# Patient Record
Sex: FEMALE | Race: BLACK OR AFRICAN AMERICAN | Employment: FULL TIME | URBAN - METROPOLITAN AREA
[De-identification: names, ages, dates, MRNs, and addresses within clinical notes are randomized per-mention and may not be internally consistent; named-entity substitution may affect disease eponyms.]

---

## 2017-01-17 ENCOUNTER — OFFICE VISIT (OUTPATIENT)
Dept: INTERNAL MEDICINE CLINIC | Age: 32
End: 2017-01-17

## 2017-01-17 ENCOUNTER — HOSPITAL ENCOUNTER (OUTPATIENT)
Dept: LAB | Age: 32
Discharge: HOME | End: 2017-01-17
Payer: OTHER GOVERNMENT

## 2017-01-17 VITALS
HEIGHT: 66 IN | DIASTOLIC BLOOD PRESSURE: 73 MMHG | RESPIRATION RATE: 16 BRPM | BODY MASS INDEX: 24.68 KG/M2 | SYSTOLIC BLOOD PRESSURE: 114 MMHG | WEIGHT: 153.6 LBS | HEART RATE: 60 BPM | OXYGEN SATURATION: 99 % | TEMPERATURE: 96.9 F

## 2017-01-17 DIAGNOSIS — N89.8 VAGINAL IRRITATION: ICD-10-CM

## 2017-01-17 DIAGNOSIS — Z32.02 PREGNANCY TEST NEGATIVE: ICD-10-CM

## 2017-01-17 DIAGNOSIS — N89.8 VAGINAL IRRITATION: Primary | ICD-10-CM

## 2017-01-17 LAB
BILIRUB UR QL STRIP: NORMAL
GLUCOSE UR-MCNC: NEGATIVE MG/DL
HCG URINE, QL. (POC): NEGATIVE
KETONES P FAST UR STRIP-MCNC: NORMAL MG/DL
PH UR STRIP: 5.5 [PH] (ref 4.6–8)
PROT UR QL STRIP: NEGATIVE MG/DL
SP GR UR STRIP: 1.02 (ref 1–1.03)
UA UROBILINOGEN AMB POC: NORMAL (ref 0.2–1)
URINALYSIS CLARITY POC: CLEAR
URINALYSIS COLOR POC: YELLOW
URINE BLOOD POC: NORMAL
URINE LEUKOCYTES POC: NEGATIVE
URINE NITRITES POC: NEGATIVE
VALID INTERNAL CONTROL?: YES

## 2017-01-17 PROCEDURE — 87481 CANDIDA DNA AMP PROBE: CPT | Performed by: FAMILY MEDICINE

## 2017-01-17 NOTE — PATIENT INSTRUCTIONS

## 2017-01-17 NOTE — PROGRESS NOTES
Pt is here to establish care. Family planning. Vaginal irritation x 1 week. Denies vaginal discharge    Do you have an advance directive no  Request Pt bring a copy of advance directive for scanning. Do you want information on an advance directive no    Pt declines  mychart activation. 1. Have you been to the ER, urgent care clinic since your last visit? Hospitalized since your last visit? No    2. Have you seen or consulted any other health care providers outside of the [address] [place] [address] since your last visit? Include any pap smears or colon screening.  No

## 2017-01-17 NOTE — MR AVS SNAPSHOT
Visit Information Date & Time Provider Department Dept. Phone Encounter #  
 1/17/2017  3:15 PM Janak Caldwell DO [facility] [place] [address] [id number] Follow-up Instructions Return if symptoms worsen or fail to improve. Upcoming Health Maintenance Date Due  
 PAP AKA CERVICAL CYTOLOGY 5/23/2017 DTaP/Tdap/Td series (2 - Td) 10/1/2023 Allergies as of 1/17/2017  Review Complete On: 1/17/2017 By: Emilie Byrd LPN No Known Allergies Current Immunizations  Never Reviewed Name Date Hep B Vaccine 7/14/1997, 5/13/1997 Influenza Vaccine 10/24/2016, 9/23/2015 MMR 4/4/1985 TB Skin Test (PPD) Intradermal 4/29/2016  3:47 PM, 4/22/2016  3:20 AM  
 Tdap 10/1/2013 Varicella Virus Vaccine 9/22/2015, 5/13/1997 Not reviewed this visit You Were Diagnosed With   
  
 Codes Comments Vaginal irritation    -  Primary ICD-10-CM: A18.7 ICD-9-CM: 623.9 Pregnancy test negative     ICD-10-CM: Z32.02 
ICD-9-CM: V72.41 Vitals BP Pulse Temp Resp Height(growth percentile) Weight(growth percentile) 114/73 60 96.9 °F (36.1 °C) (Oral) 16 5' 6\" (1.676 m) 153 lb 9.6 oz (69.7 kg) LMP SpO2 BMI OB Status Smoking Status 12/22/2016 (Exact Date) 99% 24.79 kg/m2 Having regular periods Never Smoker Vitals History BMI and BSA Data Body Mass Index Body Surface Area 24.79 kg/m 2 1.8 m 2 Preferred Pharmacy Pharmacy Name Phone [facility], [address] [place] [address] [phone] Your Updated Medication List  
  
   
This list is accurate as of: 1/17/17  3:58 PM.  Always use your most recent med list.  
  
  
  
  
 dicyclomine 10 mg capsule Commonly known as:  BENTYL Take 1 capsule by mouth three (3) times daily. Take as needed  
  
 fluticasone 50 mcg/actuation nasal spray Commonly known as:  Misha [place] 2 Sprays by Both Nostrils route daily. ketoconazole 2 % shampoo Commonly known as:  NIZORAL Apply to scalp ; lather  And rinse as directed  
  
 naproxen 500 mg tablet Commonly known as:  NAPROSYN Take 1 Tab by mouth two (2) times daily (with meals). Gaviota Fulling TD  
by TransDERmal route. * norelgestromin-ethinyl estradiol 150-35 mcg/24 hr  
Commonly known as:  ORTHO EVRA  
1 Patch by TransDERmal route every Sunday. traMADol-acetaminophen 37.5-325 mg per tablet Commonly known as:  ULTRACET Take 1 Tab by mouth every four (4) hours as needed for Pain. Max Daily Amount: 6 Tabs. * Notice: This list has 2 medication(s) that are the same as other medications prescribed for you. Read the directions carefully, and ask your doctor or other care provider to review them with you. We Performed the Following AMB POC URINALYSIS DIP STICK AUTO W/O MICRO [09820 CPT(R)] AMB POC URINE PREGNANCY TEST, VISUAL COLOR COMPARISON [08475 CPT(R)] Follow-up Instructions Return if symptoms worsen or fail to improve. To-Do List   
 01/17/2017 Lab:  HERPES SIMPLEX VIRUS (HSV) WILLA   
  
 01/24/2017 Lab:  Katarina Dumont Patient Instructions A Healthy Lifestyle: Care Instructions Your Care Instructions A healthy lifestyle can help you feel good, stay at a healthy weight, and have plenty of energy for both work and play. A healthy lifestyle is something you can share with your whole family. A healthy lifestyle also can lower your risk for serious health problems, such as high blood pressure, heart disease, and diabetes. You can follow a few steps listed below to improve your health and the health of your family. Follow-up care is a key part of your treatment and safety. Be sure to make and go to all appointments, and call your doctor if you are having problems. Its also a good idea to know your test results and keep a list of the medicines you take. How can you care for yourself at home? · Do not eat too much sugar, fat, or fast foods. You can still have dessert and treats now and then. The goal is moderation. · Start small to improve your eating habits. Pay attention to portion sizes, drink less juice and soda pop, and eat more fruits and vegetables. ¨ Eat a healthy amount of food. A 3-ounce serving of meat, for example, is about the size of a deck of cards. Fill the rest of your plate with vegetables and whole grains. ¨ Limit the amount of soda and sports drinks you have every day. Drink more water when you are thirsty. ¨ Eat at least 5 servings of fruits and vegetables every day. It may seem like a lot, but it is not hard to reach this goal. A serving or helping is 1 piece of fruit, 1 cup of vegetables, or 2 cups of leafy, raw vegetables. Have an apple or some carrot sticks as an afternoon snack instead of a candy bar. Try to have fruits and/or vegetables at every meal. 
· Make exercise part of your daily routine. You may want to start with simple activities, such as walking, bicycling, or slow swimming. Try to be active 30 to 60 minutes every day. You do not need to do all 30 to 60 minutes all at once. For example, you can exercise 3 times a day for 10 or 20 minutes. Moderate exercise is safe for most people, but it is always a good idea to talk to your doctor before starting an exercise program. 
· Keep moving. Ling Goltz the lawn, work in the garden, or [place]. Take the stairs instead of the elevator at work. · If you smoke, quit. People who smoke have an increased risk for heart attack, stroke, cancer, and other lung illnesses. Quitting is hard, but there are ways to boost your chance of quitting tobacco for good. ¨ Use nicotine gum, patches, or lozenges. ¨ Ask your doctor about stop-smoking programs and medicines. ¨ Keep trying.  
In addition to reducing your risk of diseases in the future, you will notice some benefits soon after you stop using tobacco. If you have shortness of breath or asthma symptoms, they will likely get better within a few weeks after you quit. · Limit how much alcohol you drink. Moderate amounts of alcohol (up to 2 drinks a day for men, 1 drink a day for women) are okay. But drinking too much can lead to liver problems, high blood pressure, and other health problems. Family health If you have a family, there are many things you can do together to improve your health. · Eat meals together as a family as often as possible. · Eat healthy foods. This includes fruits, vegetables, lean meats and dairy, and whole grains. · Include your family in your fitness plan. Most people think of activities such as jogging or tennis as the way to fitness, but there are many ways you and your family can be more active. Anything that makes you breathe hard and gets your heart pumping is exercise. Here are some tips: 
¨ Walk to do errands or to take your child to school or the bus. ¨ Go for a family bike ride after dinner instead of watching TV. Where can you learn more? Go to http://colin-belkis.info/. Enter B731 in the search box to learn more about \"A Healthy Lifestyle: Care Instructions. \" Current as of: July 26, 2016 Content Version: 11.1 © 9369-9765 [employer]. Care instructions adapted under license by [employer] (which disclaims liability or warranty for this information). If you have questions about a medical condition or this instruction, always ask your healthcare professional. [place] [address] any warranty or liability for your use of this information. Introducing [facility]! Dear Dom Oshea: 
Thank you for requesting a [employer] account. Our records indicate that you have previously registered for a [employer] account but its currently inactive. Please call our [employer] support line at [phone]. Additional Information If you have questions, please visit the Frequently Asked Questions section of the [employer] website at [email or web address] Science [facility] at San Antoniot. [email or web address]. [email or web address] Remember, [employer] is NOT to be used for urgent needs. For medical emergencies, dial 911. Now available from your iPhone and Android! Please provide this summary of care documentation to your next provider. Your primary care clinician is listed as Sita Lawrence. If you have any questions after today's visit, please call [phone].

## 2017-01-20 LAB
A VAGINAE DNA VAG QL NAA+PROBE: ABNORMAL SCORE
BVAB2 DNA VAG QL NAA+PROBE: ABNORMAL SCORE
C ALBICANS DNA VAG QL NAA+PROBE: POSITIVE
C GLABRATA DNA VAG QL NAA+PROBE: NEGATIVE
C TRACH RRNA SPEC QL NAA+PROBE: NEGATIVE
HSV1 DNA SPEC QL NAA+PROBE: NEGATIVE
HSV2 DNA SPEC QL NAA+PROBE: NEGATIVE
MEGA1 DNA VAG QL NAA+PROBE: ABNORMAL SCORE
N GONORRHOEA RRNA SPEC QL NAA+PROBE: NEGATIVE
SPECIMEN SOURCE: ABNORMAL
T VAGINALIS RRNA SPEC QL NAA+PROBE: NEGATIVE

## 2017-01-25 DIAGNOSIS — B37.9 YEAST INFECTION: Primary | ICD-10-CM

## 2017-01-25 RX ORDER — FLUCONAZOLE 150 MG/1
150 TABLET ORAL DAILY
Qty: 1 TAB | Refills: 1 | Status: SHIPPED | OUTPATIENT
Start: 2017-01-25 | End: 2017-01-26

## 2017-01-25 NOTE — PROGRESS NOTES
Patient called in for lab results. Verified patient's . Patient verbalized understanding and voiced no concerns. Patient will  script from pharmacy.

## 2017-01-25 NOTE — PROGRESS NOTES
HISTORY OF PRESENT ILLNESS  Aaliyah Holcomb is a 34 y.o. female. HPI  New female patient in today for a new concern. Patient reports 1 week of external vaginal irritation. This occurred a few days after sexual intercourse. Initially A and D ointment helped with irritation, symptoms have now worsened  She reports vaginal driness with intercourse a week prior to her menstrual, she denied using lubrication. She is currently on OCP's which she thinks has contributed to her driness    She has no concern for pregnancy or STI today    Personal hx:anemia, UTI, IBS, thyroid goiter      LMP 16  Last pap 2016    Fhx HTN, Anemia and asthma    She has had the flu shot in 2016    No Known Allergies    Past Medical History   Diagnosis Date    Anemia     IBS (irritable bowel syndrome)     Sickle cell trait ([facility] [address]     Thyroid goiter        Family History   Problem Relation Age of Onset    Hypertension Mother     No Known Problems Father     Cancer Maternal Grandmother      Ovarian Cancer    Cancer Paternal Aunt      breast 45s    Cancer Paternal Grandmother      breast 60s/mastectomy 6/15/15       Social History   Substance Use Topics    Smoking status: Never Smoker    Smokeless tobacco: Never Used    Alcohol use 0.0 oz/week     0 Standard drinks or equivalent per week      Comment: Social Drinker        Current Outpatient Prescriptions   Medication Sig    NORELGESTROMIN/ETHIN.ESTRADIOL Catherin Drilling TD) by TransDERmal route.  naproxen (NAPROSYN) 500 mg tablet Take 1 Tab by mouth two (2) times daily (with meals).  traMADol-acetaminophen (ULTRACET) 37.5-325 mg per tablet Take 1 Tab by mouth every four (4) hours as needed for Pain. Max Daily Amount: 6 Tabs.  dicyclomine (BENTYL) 10 mg capsule Take 1 capsule by mouth three (3) times daily. Take as needed    norelgestromin-ethinyl estradiol (ORTHO EVRA) 150-35 mcg/24 hr 1 Patch by TransDERmal route every .     fluticasone (FLONASE) 50 mcg/actuation nasal spray 2 Sprays by Both Nostrils route daily.  ketoconazole (NIZORAL) 2 % shampoo Apply to scalp ; lather  And rinse as directed     No current facility-administered medications for this visit. History reviewed. No pertinent past surgical history. Review of Systems   Constitutional: Negative for chills, fever, malaise/fatigue and weight loss. Gastrointestinal: Positive for diarrhea and heartburn. Negative for nausea. Symptoms related to IBS   Genitourinary: Negative for dysuria, frequency, hematuria and urgency. C/o vaginal d/c     See HPI    Visit Vitals    /73    Pulse 60    Temp 96.9 °F (36.1 °C) (Oral)    Resp 16    Ht 5' 6\" (1.676 m)    Wt 153 lb 9.6 oz (69.7 kg)    LMP 12/22/2016 (Exact Date)    SpO2 99%    BMI 24.79 kg/m2     Physical Exam   Abdominal: Hernia confirmed negative in the right inguinal area and confirmed negative in the left inguinal area. Genitourinary: Uterus normal. There is no rash, tenderness, lesion or injury on the right labia. There is no rash, tenderness, lesion or injury on the left labia. Cervix exhibits discharge. Cervix exhibits no motion tenderness and no friability. Right adnexum displays no mass, no tenderness and no fullness. Left adnexum displays no mass, no tenderness and no fullness. Vaginal discharge found. Genitourinary Comments: Erosion of epidermis around vaginal os, appears like friction changes   Lymphadenopathy:        Right: No inguinal adenopathy present. Left: No inguinal adenopathy present. Lab Results   Component Value Date/Time    HCG urine, Ql. (POC) Negative 01/17/2017 03:59 PM       ASSESSMENT and PLAN    ICD-10-CM ICD-9-CM    1. Vaginal irritation N89.8 623.9 AMB POC URINALYSIS DIP STICK AUTO W/O MICRO      NUSWAB VAGINITIS      HERPES SIMPLEX VIRUS (HSV) WILLA   2.  Pregnancy test negative Z32.02 V72.41 AMB POC URINE PREGNANCY TEST, VISUAL COLOR COMPARISON     -Will call patient with results.  -Patient agrees with plan    Additional Instructions: The patient understands that they should contact the office at any time if any questions or concerns develop. They are also aware that they can call our main office number at [phone] at any time if they would like to address any concerns with the physician. They also understand that they should dial 911 if any acute emergency arises. The patient understands that they should give us a minimum of 48 hours to complete prescription refills once they are requested. The patient has also been instructed to contact us by calling the main office number if they have not received feedback within 2 weeks of having any tests completed. The patient is a aware that they should read all package insert information when picking up the medications and that they should consult the pharmacist of a physician if they have any questions or concerns regarding the prescribed medications. Discussed with the patient new medications given and patient instructed to read pharmacy literature regarding side effects and drug interactions. Instructions for taking the medications were provided to the patient and the consequences of not taking it. Follow-up Disposition:   Return if symptoms worsen or fail to improve. Risk and benefits of new medication discussed in detail when indicated, patient was given the opportunity to ask questions   AVS provided  reviewed diet, exercise and weight control when indicated  Alarm signals discussed. ER precautions reviewed when indicated  Plan of care reviewed with patient. Understanding verbalized and they are in agreement with plan of care.      Kirstin Karimi DO

## 2017-07-28 ENCOUNTER — OFFICE VISIT (OUTPATIENT)
Dept: INTERNAL MEDICINE CLINIC | Age: 32
End: 2017-07-28

## 2017-07-28 ENCOUNTER — HOSPITAL ENCOUNTER (OUTPATIENT)
Dept: LAB | Age: 32
Discharge: HOME | End: 2017-07-28
Payer: OTHER GOVERNMENT

## 2017-07-28 VITALS
RESPIRATION RATE: 16 BRPM | WEIGHT: 143 LBS | HEIGHT: 66 IN | BODY MASS INDEX: 22.98 KG/M2 | HEART RATE: 72 BPM | DIASTOLIC BLOOD PRESSURE: 71 MMHG | OXYGEN SATURATION: 99 % | SYSTOLIC BLOOD PRESSURE: 112 MMHG | TEMPERATURE: 98.2 F

## 2017-07-28 DIAGNOSIS — N89.8 VAGINAL IRRITATION: ICD-10-CM

## 2017-07-28 DIAGNOSIS — N89.8 VAGINAL IRRITATION: Primary | ICD-10-CM

## 2017-07-28 LAB — RPR SER QL: NONREACTIVE

## 2017-07-28 PROCEDURE — 87389 HIV-1 AG W/HIV-1&-2 AB AG IA: CPT | Performed by: FAMILY MEDICINE

## 2017-07-28 PROCEDURE — 87481 CANDIDA DNA AMP PROBE: CPT | Performed by: FAMILY MEDICINE

## 2017-07-28 PROCEDURE — 86592 SYPHILIS TEST NON-TREP QUAL: CPT | Performed by: FAMILY MEDICINE

## 2017-07-28 PROCEDURE — 36415 COLL VENOUS BLD VENIPUNCTURE: CPT | Performed by: FAMILY MEDICINE

## 2017-07-28 RX ORDER — NYSTATIN 100000 U/G
CREAM TOPICAL 2 TIMES DAILY
Qty: 15 G | Refills: 0 | Status: SHIPPED | OUTPATIENT
Start: 2017-07-28 | End: 2019-05-24

## 2017-07-28 NOTE — PATIENT INSTRUCTIONS

## 2017-07-28 NOTE — PROGRESS NOTES
Subjective:   34 y.o. female for recurrent gyn concern  Patient's last menstrual period was 07/27/2017 (exact date). Social History: single partner, contraception - Ortho-Evra patches weekly. She recently stopped these patches as she felt that they contributed to vaginal dryness which has improved. Pertinent past medical hstory: no history of HTN, DVT, CAD, DM, liver disease, migraines or smoking. She has agreed to STI screening today    No Known Allergies    Past Medical History:   Diagnosis Date    Anemia     IBS (irritable bowel syndrome)     [facility] cell trait ([facility] [address]     Thyroid goiter        Family History   Problem Relation Age of Onset    Hypertension Mother     No Known Problems Father     Cancer Maternal Grandmother      Ovarian Cancer    Cancer Paternal Aunt      breast 45s    Cancer Paternal Grandmother      breast 60s/mastectomy 6/15/15       Social History   Substance Use Topics    Smoking status: Never Smoker    Smokeless tobacco: Never Used    Alcohol use 0.0 oz/week     0 Standard drinks or equivalent per week      Comment: Social Drinker        Current Outpatient Prescriptions   Medication Sig    nystatin (MYCOSTATIN) topical cream Apply  to affected area two (2) times a day.  dicyclomine (BENTYL) 10 mg capsule Take 1 capsule by mouth three (3) times daily. Take as needed    norelgestromin-ethinyl estradiol Doyal Cough) 150-35 mcg/24 hr 1 Patch by TransDERmal route Every Saturday.  naproxen (NAPROSYN) 500 mg tablet Take 1 Tab by mouth two (2) times daily (with meals).  traMADol-acetaminophen (ULTRACET) 37.5-325 mg per tablet Take 1 Tab by mouth every four (4) hours as needed for Pain. Max Daily Amount: 6 Tabs. No current facility-administered medications for this visit. History reviewed. No pertinent surgical history. ROS:  Feeling well. No urinary tract symptoms.  GYN ROS: normal menses, no abnormal bleeding, pelvic pain or discharge, she complains of intermittent spotting since she discontinued the patch. No neurological complaints. Objective:     Visit Vitals    /71    Pulse 72    Temp 98.2 °F (36.8 °C) (Oral)    Resp 16    Ht 5' 6\" (1.676 m)    Wt 143 lb (64.9 kg)    LMP 07/27/2017 (Exact Date)    SpO2 99%    BMI 23.08 kg/m2     The patient appears well, alert, oriented x 3, in no distress. ENT normal.  Neck supple. No adenopathy or thyromegaly. DANIEL. Lungs are clear, good air entry, no wheezes, rhonchi or rales. S1 and S2 normal, no murmurs, regular rate and rhythm. Abdomen soft without tenderness, guarding, mass or organomegaly. Extremities show no edema, normal peripheral pulses. Neurological is normal, no focal findings. Vaginal: Erosion of epidermis around vaginal os, appears like friction changes     Diagnoses and all orders for this visit:    1. Vaginal irritation  -     NUSWAB VAGINITIS PLUS; Future  -     RPR; Future  -     HIV 1/2 AG/AB, 4TH GENERATION,W RFLX CONFIRM; Future  -     nystatin (MYCOSTATIN) topical cream; Apply  to affected area two (2) times a day. Additional Instructions: The patient understands that they should contact the office at any time if any questions or concerns develop. They are also aware that they can call our main office number at [phone] at any time if they would like to address any concerns with the physician. They also understand that they should dial 911 if any acute emergency arises. The patient understands that they should give us a minimum of 48 hours to complete prescription refills once they are requested. The patient has also been instructed to contact us by calling the main office number if they have not received feedback within 2 weeks of having any tests completed.   The patient is a aware that they should read all package insert information when picking up the medications and that they should consult the pharmacist of a physician if they have any questions or concerns regarding the prescribed medications. Discussed with the patient new medications given and patient instructed to read pharmacy literature regarding side effects and drug interactions. Instructions for taking the medications were provided to the patient and the consequences of not taking it. Follow-up Disposition:   Return if symptoms worsen or fail to improve. Risk and benefits of new medication discussed in detail when indicated, patient was given the opportunity to ask questions   AVS provided  reviewed diet, exercise and weight control when indicated  Alarm signals discussed. ER precautions reviewed when indicated  Plan of care reviewed with patient. Understanding verbalized and they are in agreement with plan of care.      Dalia Lujan, DO

## 2017-07-28 NOTE — PROGRESS NOTES
Pt is here to discuss vaginal irritation. Pt stopped her hormone patch. 1. Have you been to the ER, urgent care clinic since your last visit? Hospitalized since your last visit? No    2. Have you seen or consulted any other health care providers outside of the [address] [place] [address] since your last visit? Include any pap smears or colon screening.  No

## 2017-07-28 NOTE — MR AVS SNAPSHOT
Visit Information Date & Time Provider Department Dept. Phone Encounter #  
 7/28/2017  8:45 AM Sanjeev Mata DO [facility] 8 [id number] Follow-up Instructions Return if symptoms worsen or fail to improve. Upcoming Health Maintenance Date Due  
 PAP AKA CERVICAL CYTOLOGY 5/23/2017 INFLUENZA AGE 9 TO ADULT 8/1/2017 DTaP/Tdap/Td series (2 - Td) 10/1/2023 Allergies as of 7/28/2017  Review Complete On: 7/28/2017 By: Sanjeev Mata DO No Known Allergies Current Immunizations  Never Reviewed Name Date Hep B Vaccine 7/14/1997, 5/13/1997 Influenza Vaccine 10/24/2016, 9/23/2015 MMR 4/4/1985 TB Skin Test (PPD) Intradermal 4/29/2016  3:47 PM, 4/22/2016  3:20 AM  
 Tdap 10/1/2013 Varicella Virus Vaccine 9/22/2015, 5/13/1997 Not reviewed this visit You Were Diagnosed With   
  
 Codes Comments Vaginal irritation    -  Primary ICD-10-CM: B46.3 ICD-9-CM: 623.9 Vitals BP Pulse Temp Resp Height(growth percentile) Weight(growth percentile) 112/71 72 98.2 °F (36.8 °C) (Oral) 16 5' 6\" (1.676 m) 143 lb (64.9 kg) LMP SpO2 BMI OB Status Smoking Status 07/27/2017 (Exact Date) 99% 23.08 kg/m2 Having regular periods Never Smoker Vitals History BMI and BSA Data Body Mass Index Body Surface Area 23.08 kg/m 2 1.74 m 2 Preferred Pharmacy Pharmacy Name Phone [address], [address] [place] [address] [phone] [facility] [phone] Your Updated Medication List  
  
   
This list is accurate as of: 7/28/17  9:13 AM.  Always use your most recent med list.  
  
  
  
  
 dicyclomine 10 mg capsule Commonly known as:  BENTYL Take 1 capsule by mouth three (3) times daily. Take as needed  
  
 naproxen 500 mg tablet Commonly known as:  NAPROSYN Take 1 Tab by mouth two (2) times daily (with meals). norelgestromin-ethinyl estradiol 150-35 mcg/24 hr  
Commonly known as:  XULANE  
1 Patch by TransDERmal route Every Saturday. nystatin topical cream  
Commonly known as:  MYCOSTATIN Apply  to affected area two (2) times a day. traMADol-acetaminophen 37.5-325 mg per tablet Commonly known as:  ULTRACET Take 1 Tab by mouth every four (4) hours as needed for Pain. Max Daily Amount: 6 Tabs. Prescriptions Sent to Pharmacy Refills  
 nystatin (MYCOSTATIN) topical cream 0 Sig: Apply  to affected area two (2) times a day. Class: Normal  
 Pharmacy: [address] [place] [address] #: [phone] Route: Topical  
  
Follow-up Instructions Return if symptoms worsen or fail to improve. To-Do List   
 07/28/2017 Lab:  HIV 1/2 AG/AB, 4TH GENERATION,W RFLX CONFIRM   
  
 07/28/2017 Lab:  NUSWAB VAGINITIS PLUS   
  
 07/28/2017 Lab:  [facility] Patient Instructions A Healthy Lifestyle: Care Instructions Your Care Instructions A healthy lifestyle can help you feel good, stay at a healthy weight, and have plenty of energy for both work and play. A healthy lifestyle is something you can share with your whole family. A healthy lifestyle also can lower your risk for serious health problems, such as high blood pressure, heart disease, and diabetes. You can follow a few steps listed below to improve your health and the health of your family. Follow-up care is a key part of your treatment and safety. Be sure to make and go to all appointments, and call your doctor if you are having problems. Its also a good idea to know your test results and keep a list of the medicines you take. How can you care for yourself at home? · Do not eat too much sugar, fat, or fast foods. You can still have dessert and treats now and then. The goal is moderation. · Start small to improve your eating habits. Pay attention to portion sizes, drink less juice and soda pop, and eat more fruits and vegetables. ¨ Eat a healthy amount of food. A 3-ounce serving of meat, for example, is about the size of a deck of cards. Fill the rest of your plate with vegetables and whole grains. ¨ Limit the amount of soda and sports drinks you have every day. Drink more water when you are thirsty. ¨ Eat at least 5 servings of fruits and vegetables every day. It may seem like a lot, but it is not hard to reach this goal. A serving or helping is 1 piece of fruit, 1 cup of vegetables, or 2 cups of leafy, raw vegetables. Have an apple or some carrot sticks as an afternoon snack instead of a candy bar. Try to have fruits and/or vegetables at every meal. 
· Make exercise part of your daily routine. You may want to start with simple activities, such as walking, bicycling, or slow swimming. Try to be active 30 to 60 minutes every day. You do not need to do all 30 to 60 minutes all at once. For example, you can exercise 3 times a day for 10 or 20 minutes. Moderate exercise is safe for most people, but it is always a good idea to talk to your doctor before starting an exercise program. 
· Keep moving. [employer] the lawn, work in the garden, or [employer]. Take the stairs instead of the elevator at work. · If you smoke, quit. People who smoke have an increased risk for heart attack, stroke, cancer, and other lung illnesses. Quitting is hard, but there are ways to boost your chance of quitting tobacco for good. ¨ Use nicotine gum, patches, or lozenges. ¨ Ask your doctor about stop-smoking programs and medicines. ¨ Keep trying. In addition to reducing your risk of diseases in the future, you will notice some benefits soon after you stop using tobacco. If you have shortness of breath or asthma symptoms, they will likely get better within a few weeks after you quit. · Limit how much alcohol you drink. Moderate amounts of alcohol (up to 2 drinks a day for men, 1 drink a day for women) are okay. But drinking too much can lead to liver problems, high blood pressure, and other health problems. Family health If you have a family, there are many things you can do together to improve your health. · Eat meals together as a family as often as possible. · Eat healthy foods. This includes fruits, vegetables, lean meats and dairy, and whole grains. · Include your family in your fitness plan. Most people think of activities such as jogging or tennis as the way to fitness, but there are many ways you and your family can be more active. Anything that makes you breathe hard and gets your heart pumping is exercise. Here are some tips: 
¨ Walk to do errands or to take your child to school or the bus. ¨ Go for a family bike ride after dinner instead of watching TV. Where can you learn more? Go to http://colin-belkis.info/. Enter D911 in the search box to learn more about \"A Healthy Lifestyle: Care Instructions. \" Current as of: July 26, 2016 Content Version: 11.3 © 0528-1882 [employer]. Care instructions adapted under license by [employer] (which disclaims liability or warranty for this information). If you have questions about a medical condition or this instruction, always ask your healthcare professional. [place] [address] any warranty or liability for your use of this information. Introducing [facility]! Dear Dylan Lee: 
Thank you for requesting a Customer.io account. Our records indicate that you have previously registered for a Customer.io account but its currently inactive. Please call our Customer[employer]io support line at [phone]. Additional Information If you have questions, please visit the Frequently Asked Questions section of the Customer.[employer] website at [email or web address] Remember, MyChart is NOT to be used for urgent needs. For medical emergencies, dial 911. Now available from your iPhone and Android! Please provide this summary of care documentation to your next provider. Your primary care clinician is listed as Abdi King. If you have any questions after today's visit, please call [phone].

## 2017-07-31 LAB
HIV 1+2 AB+HIV1 P24 AG SERPL QL IA: NONREACTIVE
HIV12 RESULT COMMENT, HHIVC: NORMAL

## 2017-08-01 LAB
A VAGINAE DNA VAG QL NAA+PROBE: NORMAL SCORE
BVAB2 DNA VAG QL NAA+PROBE: NORMAL SCORE
C ALBICANS DNA VAG QL NAA+PROBE: NEGATIVE
C GLABRATA DNA VAG QL NAA+PROBE: NEGATIVE
C TRACH RRNA SPEC QL NAA+PROBE: NEGATIVE
HSV1 DNA SPEC QL NAA+PROBE: NEGATIVE
HSV2 DNA SPEC QL NAA+PROBE: NEGATIVE
MEGA1 DNA VAG QL NAA+PROBE: NORMAL SCORE
N GONORRHOEA RRNA SPEC QL NAA+PROBE: NEGATIVE
SPECIMEN SOURCE: NORMAL
T VAGINALIS RRNA SPEC QL NAA+PROBE: NEGATIVE

## 2017-08-04 ENCOUNTER — TELEPHONE (OUTPATIENT)
Dept: INTERNAL MEDICINE CLINIC | Age: 32
End: 2017-08-04

## 2017-08-04 NOTE — TELEPHONE ENCOUNTER
Pt returned call and given message:    Please advise patient of negative NUSWAB, HIV and syphilis test.      Electronically signed by Bonnie Sun DO at 08/02/17 [id number]

## 2017-08-08 NOTE — PROGRESS NOTES
I called Pt in regards to Lab results. Informed pt htat Labs were negative Pt verbalized understanding.

## 2018-12-17 ENCOUNTER — OFFICE VISIT (OUTPATIENT)
Dept: FAMILY MEDICINE CLINIC | Age: 33
End: 2018-12-17

## 2018-12-17 VITALS
OXYGEN SATURATION: 97 % | BODY MASS INDEX: 30.05 KG/M2 | HEIGHT: 66 IN | WEIGHT: 187 LBS | DIASTOLIC BLOOD PRESSURE: 57 MMHG | SYSTOLIC BLOOD PRESSURE: 122 MMHG | TEMPERATURE: 99.1 F | RESPIRATION RATE: 16 BRPM | HEART RATE: 69 BPM

## 2018-12-17 DIAGNOSIS — G56.21: Primary | ICD-10-CM

## 2018-12-17 RX ORDER — DICLOFENAC SODIUM 10 MG/G
2 GEL TOPICAL
Qty: 100 G | Refills: 0 | Status: SHIPPED | OUTPATIENT
Start: 2018-12-17 | End: 2020-08-29

## 2018-12-17 RX ORDER — PREDNISONE 10 MG/1
TABLET ORAL
Qty: 21 TAB | Refills: 0 | Status: SHIPPED | OUTPATIENT
Start: 2018-12-17 | End: 2019-05-24 | Stop reason: ALTCHOICE

## 2018-12-17 RX ORDER — MELOXICAM 15 MG/1
15 TABLET ORAL DAILY
Qty: 30 TAB | Refills: 1 | Status: SHIPPED | OUTPATIENT
Start: 2018-12-17 | End: 2020-04-20 | Stop reason: SDUPTHER

## 2018-12-17 RX ORDER — TRAMADOL HYDROCHLORIDE 50 MG/1
50 TABLET ORAL
Qty: 30 TAB | Refills: 0 | Status: SHIPPED | OUTPATIENT
Start: 2018-12-17 | End: 2018-12-24

## 2018-12-17 NOTE — PROGRESS NOTES
Pt is here for R arm pain intermittently x 2 weeks. HX of ulnar entrapment. 1. Have you been to the ER, urgent care clinic since your last visit? Hospitalized since your last visit? Yes Adolfo Jimenez 5/18 childbirth    2. Have you seen or consulted any other health care providers outside of the [address] [place] [address] since your last visit? Include any pap smears or colon screening.  Yes Dr Ken Walsh 10/18

## 2018-12-17 NOTE — PROGRESS NOTES
HISTORY OF PRESENT ILLNESS  Liliam Whittington is a 28 y.o. female. Patient presents today with right elbow and hand pain x 2 weeks. Pt has associated numbness and tingling in all four fingers. Pt has a hx of ulnar entrapment. Pt has states the pain is dull and achy and increases in intensity when carrying heavy objects such as her child's car seat. Pt says the pain is relieved by OTC motrin which provided partial relief. Other medications used in the past were Tramadol and Mobic which both provided total relief. No Known Allergies  Current Outpatient Medications   Medication Sig Dispense Refill    norelgestromin-ethinyl estradiol Dorisann Hernandez) 150-35 mcg/24 hr 1 Patch by TransDERmal route Every Saturday. 3 Patch 11    nystatin (MYCOSTATIN) topical cream Apply  to affected area two (2) times a day. 15 g 0    naproxen (NAPROSYN) 500 mg tablet Take 1 Tab by mouth two (2) times daily (with meals). 60 Tab 0    traMADol-acetaminophen (ULTRACET) 37.5-325 mg per tablet Take 1 Tab by mouth every four (4) hours as needed for Pain. Max Daily Amount: 6 Tabs. 40 Tab 0    dicyclomine (BENTYL) 10 mg capsule Take 1 capsule by mouth three (3) times daily.  Take as needed 90 capsule 1     Past Medical History:   Diagnosis Date    Anemia     IBS (irritable bowel syndrome)     Sickle cell trait (HCC)     Thyroid goiter      Social History     Socioeconomic History    Marital status:      Spouse name: Not on file    Number of children: Not on file    Years of education: Not on file    Highest education level: Not on file   Social Needs    Financial resource strain: Not on file    Food insecurity - worry: Not on file    Food insecurity - inability: Not on file   [place] needs - medical: Not on file   [place] needs - non-medical: Not on file   Occupational History    Occupation: homemaker   Tobacco Use    Smoking status: Never Smoker    Smokeless tobacco: Never Used   Substance and Sexual Activity  Alcohol use: Yes     Alcohol/week: 0.0 oz     Comment: Social Drinker    Drug use: No    Sexual activity: Yes     Partners: Male     Comment: birth control patch   Other Topics Concern     Service Not Asked    Blood Transfusions Not Asked    Caffeine Concern Yes     Comment: 1 cup a day    Occupational Exposure Not Asked    Hobby Hazards Not Asked    Sleep Concern No    Stress Concern No    Weight Concern No    Special Diet Not Asked    Back Care Not Asked    Exercise Yes     Comment: cardio 4 times a week    Bike Helmet Not Asked    Seat Belt Yes    Self-Exams Yes   Social History Narrative    Not on file     Review of Systems   Constitutional: Negative for chills and fever. Musculoskeletal: Positive for joint pain (right wrist pain ). Neurological: Negative for dizziness and headaches. /57 (BP 1 Location: Left arm)   Pulse 69   Temp 99.1 °F (37.3 °C) (Oral)   Resp 16   Ht 5' 6\" (1.676 m)   Wt 187 lb (84.8 kg)   LMP 12/01/2018 (Exact Date)   SpO2 97%   BMI 30.18 kg/m²   Physical Exam   Constitutional: She appears well-developed and well-nourished. HENT:   Head: Normocephalic and atraumatic. Neck: Normal range of motion. Neck supple. Cardiovascular: Normal rate, regular rhythm and normal heart sounds. Exam reveals no gallop and no friction rub. No murmur heard. Pulmonary/Chest: Effort normal and breath sounds normal. She has no wheezes. She has no rhonchi. She has no rales. Musculoskeletal:        Right wrist: She exhibits tenderness (mildly to lateral posterior hand). Skin: Skin is warm and dry. ASSESSMENT and PLAN    ICD-10-CM ICD-9-CM    1. Ulnar nerve entrapment syndrome, right G56.21 354. 2 predniSONE (STERAPRED DS) 10 mg dose pack      meloxicam (MOBIC) 15 mg tablet      diclofenac (VOLTAREN) 1 % gel      traMADol (ULTRAM) 50 mg tablet     I have discussed the diagnosis with the patient and the intended plan as seen in the above orders.   The patient has received an after-visit summary and questions were answered concerning future plans. I have discussed medication side effects and warnings with the patient as well. Patient agreeable with above plan and verbalizes understanding. Follow-up Disposition:  Return in about 2 weeks (around 12/31/2018) for ulnar entrapment .

## 2018-12-17 NOTE — PATIENT INSTRUCTIONS
Ulnar Neuropathy (Handlebar Palsy): Exercises  Your Care Instructions  Here are some examples of typical rehabilitation exercises for your condition. Start each exercise slowly. Ease off the exercise if you start to have pain. Your doctor or your physical or occupational therapist will tell you when you can start these exercises and which ones will work best for you. How to do the exercises  Neck rotation    1. Sit in a firm chair, or stand up straight. 2. Keeping your chin level, turn your head to the right, and hold for 15 to 30 seconds. 3. Turn your head to the left, and hold for 15 to 30 seconds. 4. Repeat 2 to 4 times to each side. Shoulder blade squeeze    1. While standing with your arms at your sides, squeeze your shoulder blades together. Do not raise your shoulders as you are squeezing. 2. Hold for 6 seconds. 3. Repeat 8 to 12 times. Neck stretches    1. Look straight ahead, and tip your right ear to your right shoulder. Do not let your left shoulder rise as you tip your head to the right. 2. Hold for 15 to 30 seconds. 3. Tilt your head to the left. Do not let your right shoulder rise as you tip your head to the left. 4. Hold for 15 to 30 seconds. 5. Repeat 2 to 4 times to each side. Elbow flexion and extension    1. Stand with your arms relaxed at your sides. 2. With your affected arm, gently bend your elbow up toward you as far as possible. 3. Then straighten your arm as much as you can. 4. Repeat 2 to 4 times. Wrist flexor stretch    1. Extend your affected arm in front of you with your palm facing away from your body. 2. Bend back your wrist on your affected arm, pointing your hand up toward the ceiling. 3. With your other hand, gently bend your wrist farther until you feel a mild to moderate stretch in your forearm. 4. Hold for at least 15 to 30 seconds. 5. Repeat 2 to 4 times.   6. Repeat steps 1 through 5, but this time extend your affected arm in front of you with your palm facing up. Then bend back your wrist, pointing your hand toward the floor. Wrist flexion and extension    1. Place your forearm on a table, with your affected hand and wrist extended beyond the table, palm down. 2. Slowly bend your wrist to move your hand upward and allow your hand to close into a fist. Hold for about 6 seconds. 3. Then lower your hand and allow your fingers to relax. Hold this position for about 6 seconds. You should feel a gentle stretch. 4. Repeat 8 to 12 times. Follow-up care is a key part of your treatment and safety. Be sure to make and go to all appointments, and call your doctor if you are having problems. It's also a good idea to know your test results and keep a list of the medicines you take. Where can you learn more? Go to http://colin-belkis.info/. Enter R636 in the search box to learn more about \"Ulnar Neuropathy (Handlebar Palsy): Exercises. \"  Current as of: November 29, 2017  Content Version: 11.8  © 4236-6563 Healthwise, Incorporated. Care instructions adapted under license by [employer] (which disclaims liability or warranty for this information). If you have questions about a medical condition or this instruction, always ask your healthcare professional. [address] any warranty or liability for your use of this information.

## 2019-01-14 RX ORDER — DICYCLOMINE HYDROCHLORIDE 10 MG/1
10 CAPSULE ORAL 3 TIMES DAILY
Qty: 90 CAP | Refills: 1 | Status: SHIPPED | OUTPATIENT
Start: 2019-01-14 | End: 2020-04-20 | Stop reason: SDUPTHER

## 2019-01-14 NOTE — TELEPHONE ENCOUNTER
Pt says that her and jennie spoke about refilling this med at last apt. Requested Prescriptions     Pending Prescriptions Disp Refills    dicyclomine (BENTYL) 10 mg capsule 90 Cap 1     Sig: Take 1 Cap by mouth three (3) times daily.  Take as needed

## 2019-05-24 ENCOUNTER — OFFICE VISIT (OUTPATIENT)
Dept: FAMILY MEDICINE CLINIC | Age: 34
End: 2019-05-24

## 2019-05-24 VITALS
DIASTOLIC BLOOD PRESSURE: 66 MMHG | OXYGEN SATURATION: 97 % | WEIGHT: 190 LBS | TEMPERATURE: 98.5 F | HEIGHT: 66 IN | HEART RATE: 87 BPM | BODY MASS INDEX: 30.53 KG/M2 | RESPIRATION RATE: 16 BRPM | SYSTOLIC BLOOD PRESSURE: 127 MMHG

## 2019-05-24 DIAGNOSIS — J06.9 UPPER RESPIRATORY TRACT INFECTION, UNSPECIFIED TYPE: Primary | ICD-10-CM

## 2019-05-24 RX ORDER — FLUCONAZOLE 150 MG/1
150 TABLET ORAL
Qty: 2 TAB | Refills: 0 | Status: SHIPPED | OUTPATIENT
Start: 2019-05-24 | End: 2019-06-01

## 2019-05-24 RX ORDER — MONTELUKAST SODIUM 10 MG/1
10 TABLET ORAL
Qty: 30 TAB | Refills: 0 | Status: SHIPPED | OUTPATIENT
Start: 2019-05-24 | End: 2020-08-11

## 2019-05-24 RX ORDER — AMOXICILLIN AND CLAVULANATE POTASSIUM 875; 125 MG/1; MG/1
1 TABLET, FILM COATED ORAL EVERY 12 HOURS
Qty: 20 TAB | Refills: 0 | Status: SHIPPED | OUTPATIENT
Start: 2019-05-24 | End: 2019-06-03

## 2019-05-24 NOTE — PROGRESS NOTES
HISTORY OF PRESENT ILLNESS  Akanksha Park is a 27 y.o. female. Cold Symptoms   The history is provided by the patient. This is a new problem. Episode onset: 4 days ago  The problem has not changed since onset. The cough is non-productive. There has been no fever. Associated symptoms include ear congestion, rhinorrhea and sore throat. Pertinent negatives include no chest pain, no chills, no sweats, no ear pain, no headaches, no myalgias and no shortness of breath. Treatments tried: tylenol cold/cough and dayquil, otc medications for allergies  The treatment provided no relief. She is not a smoker. No Known Allergies  Current Outpatient Medications   Medication Sig Dispense Refill    dicyclomine (BENTYL) 10 mg capsule Take 1 Cap by mouth three (3) times daily. Take as needed 90 Cap 1    meloxicam (MOBIC) 15 mg tablet Take 1 Tab by mouth daily. 30 Tab 1    diclofenac (VOLTAREN) 1 % gel Apply 2 g to affected area four (4) times daily as needed. 100 g 0    norelgestromin-ethinyl estradiol Nora Gravely) 150-35 mcg/24 hr 1 Patch by TransDERmal route Every Saturday. 3 Patch 11     Past Medical History:   Diagnosis Date    Anemia     IBS (irritable bowel syndrome)     Sickle cell trait (HCC)     Thyroid goiter      Social History     Socioeconomic History    Marital status:      Spouse name: Not on file    Number of children: Not on file    Years of education: Not on file    Highest education level: Not on file   Occupational History    Occupation: homemaker   Social Needs    Financial resource strain: Not on file    Food insecurity:     Worry: Not on file     Inability: Not on file    Transportation needs:     Medical: Not on file     Non-medical: Not on file   Tobacco Use    Smoking status: Never Smoker    Smokeless tobacco: Never Used   Substance and Sexual Activity    Alcohol use:  Yes     Alcohol/week: 0.0 oz     Comment: Social Drinker    Drug use: No    Sexual activity: Yes     Partners: Male Comment: birth control patch   Lifestyle    Physical activity:     Days per week: Not on file     Minutes per session: Not on file    Stress: Not on file   Relationships    Social connections:     Talks on phone: Not on file     Gets together: Not on file     Attends Christianity service: Not on file     Active member of club or organization: Not on file     Attends meetings of clubs or organizations: Not on file     Relationship status: Not on file    Intimate partner violence:     Fear of current or ex partner: Not on file     Emotionally abused: Not on file     Physically abused: Not on file     Forced sexual activity: Not on file   Other Topics Concern     Service Not Asked    Blood Transfusions Not Asked    Caffeine Concern Yes     Comment: 1 cup a day    Occupational Exposure Not Asked    Hobby Hazards Not Asked    Sleep Concern No    Stress Concern No    Weight Concern No    Special Diet Not Asked    Back Care Not Asked    Exercise Yes     Comment: cardio 4 times a week    Bike Helmet Not Asked    Seat Belt Yes    Self-Exams Yes   Social History Narrative    Not on file     Review of Systems   Constitutional: Negative for chills and fever. HENT: Positive for congestion, rhinorrhea, sinus pain (mild) and sore throat. Negative for ear pain. Respiratory: Positive for cough. Negative for shortness of breath. Cardiovascular: Negative for chest pain and palpitations. Musculoskeletal: Negative for myalgias. Neurological: Negative for headaches. /66 (BP 1 Location: Left arm)   Pulse 87   Temp 98.5 °F (36.9 °C) (Oral)   Resp 16   Ht 5' 6\" (1.676 m)   Wt 190 lb (86.2 kg)   LMP 05/16/2019 (Exact Date)   SpO2 97%   BMI 30.67 kg/m²   Physical Exam   Constitutional: She appears well-developed and well-nourished. HENT:   Head: Normocephalic and atraumatic. Right Ear: A middle ear effusion is present. Left Ear: A middle ear effusion is present.    Nose: Mucosal edema present. Right sinus exhibits no maxillary sinus tenderness and no frontal sinus tenderness. Left sinus exhibits no maxillary sinus tenderness and no frontal sinus tenderness. Mouth/Throat: Uvula is midline, oropharynx is clear and moist and mucous membranes are normal.   Eyes: Pupils are equal, round, and reactive to light. Conjunctivae and EOM are normal.   Neck: Normal range of motion. Neck supple. Cardiovascular: Normal rate, regular rhythm and normal heart sounds. Exam reveals no gallop and no friction rub. No murmur heard. Pulmonary/Chest: Effort normal and breath sounds normal. She has no wheezes. She has no rhonchi. She has no rales. Lymphadenopathy:     She has no cervical adenopathy. ASSESSMENT and PLAN    ICD-10-CM ICD-9-CM    1. Upper respiratory tract infection, unspecified type J06.9 465.9      Orders Placed This Encounter    amoxicillin-clavulanate (AUGMENTIN) 875-125 mg per tablet    montelukast (SINGULAIR) 10 mg tablet    fluconazole (DIFLUCAN) 150 mg tablet     Symptomatic relief discussed  I have discussed the diagnosis with the patient and the intended plan as seen in the above orders. The patient has received an after-visit summary and questions were answered concerning future plans. I have discussed medication side effects and warnings with the patient as well. Patient agreeable with above plan and verbalizes understanding. Follow-up and Dispositions    · Return if symptoms worsen or fail to improve.

## 2019-05-24 NOTE — PROGRESS NOTES
Pt is here for congestion, cough, stuffy ears x 4 days    1. Have you been to the ER, urgent care clinic since your last visit? Hospitalized since your last visit? Yes [facility] Allergic reaction to insect bites. 2. Have you seen or consulted any other health care providers outside of the [facility] since your last visit? Include any pap smears or colon screening.  No

## 2019-06-06 ENCOUNTER — OFFICE VISIT (OUTPATIENT)
Dept: FAMILY MEDICINE CLINIC | Age: 34
End: 2019-06-06

## 2019-06-06 VITALS
HEIGHT: 66 IN | WEIGHT: 191 LBS | SYSTOLIC BLOOD PRESSURE: 126 MMHG | BODY MASS INDEX: 30.7 KG/M2 | OXYGEN SATURATION: 99 % | HEART RATE: 75 BPM | RESPIRATION RATE: 16 BRPM | TEMPERATURE: 98.7 F | DIASTOLIC BLOOD PRESSURE: 62 MMHG

## 2019-06-06 DIAGNOSIS — B96.89 BACTERIAL VAGINOSIS: Primary | ICD-10-CM

## 2019-06-06 DIAGNOSIS — N76.0 BACTERIAL VAGINOSIS: Primary | ICD-10-CM

## 2019-06-06 RX ORDER — CLINDAMYCIN HYDROCHLORIDE 300 MG/1
300 CAPSULE ORAL 2 TIMES DAILY
Qty: 14 CAP | Refills: 0 | Status: SHIPPED | OUTPATIENT
Start: 2019-06-06 | End: 2019-06-13

## 2019-06-06 RX ORDER — FLUCONAZOLE 150 MG/1
150 TABLET ORAL
Qty: 3 TAB | Refills: 0 | Status: SHIPPED | OUTPATIENT
Start: 2019-06-06 | End: 2019-06-13

## 2019-06-06 NOTE — PATIENT INSTRUCTIONS
Bacterial Vaginosis: Care Instructions  Your Care Instructions    Bacterial vaginosis is a type of vaginal infection. It is caused by excess growth of certain bacteria that are normally found in the vagina. Symptoms can include itching, swelling, pain when you urinate or have sex, and a gray or yellow discharge with a \"fishy\" odor. It is not considered an infection that is spread through sexual contact. Although symptoms can be annoying and uncomfortable, bacterial vaginosis does not usually cause other health problems. However, if you have it while you are pregnant, it can cause complications. While the infection may go away on its own, most doctors use antibiotics to treat it. You may have been prescribed pills or vaginal cream. With treatment, bacterial vaginosis usually clears up in 5 to 7 days. Follow-up care is a key part of your treatment and safety. Be sure to make and go to all appointments, and call your doctor if you are having problems. It's also a good idea to know your test results and keep a list of the medicines you take. How can you care for yourself at home? · Take your antibiotics as directed. Do not stop taking them just because you feel better. You need to take the full course of antibiotics. · Do not eat or drink anything that contains alcohol if you are taking metronidazole (Flagyl). · Keep using your medicine if you start your period. Use pads instead of tampons while using a vaginal cream or suppository. Tampons can absorb the medicine. · Wear loose cotton clothing. Do not wear nylon and other materials that hold body heat and moisture close to the skin. · Do not scratch. Relieve itching with a cold pack or a cool bath. · Do not wash your vaginal area more than once a day. Use plain water or a mild, unscented soap. Do not douche. When should you call for help?   Watch closely for changes in your health, and be sure to contact your doctor if:    · You have unexpected vaginal bleeding.     · You have a fever.     · You have new or increased pain in your vagina or pelvis.     · You are not getting better after 1 week.     · Your symptoms return after you finish the course of your medicine. Where can you learn more? Go to http://colin-belkis.info/. Jae Cid in the search box to learn more about \"Bacterial Vaginosis: Care Instructions. \"  Current as of: May 14, 2018  Content Version: 11.9  © 9160-4514 "[employer]. Care instructions adapted under license by [employer] (which disclaims liability or warranty for this information). If you have questions about a medical condition or this instruction, always ask your healthcare professional. [address] any warranty or liability for your use of this information.

## 2019-06-06 NOTE — PROGRESS NOTES
HISTORY OF PRESENT ILLNESS  Jeancarlos Mcdonnell is a 27 y.o. female. HPI   Patient states has vaginal irritation. She was treated for acute uri due to worsening of symptoms with augmentin and provided with diflucan for recurrent vaginitis that occurs with taking antibiotics which she completed without improvement. Reports she was only able to take augmentin for a couple of days before vaginitis symptoms began and discontinued medication due to onset of symptoms. States she has been using monistat without improvement. Denies vaginal discharge. Reports itching/irritation is mainly on the outer labia. No Known Allergies  Current Outpatient Medications   Medication Sig Dispense Refill    dicyclomine (BENTYL) 10 mg capsule Take 1 Cap by mouth three (3) times daily. Take as needed 90 Cap 1    meloxicam (MOBIC) 15 mg tablet Take 1 Tab by mouth daily. 30 Tab 1    norelgestromin-ethinyl estradiol Josie Darwin) 150-35 mcg/24 hr 1 Patch by TransDERmal route Every Saturday. 3 Patch 11    montelukast (SINGULAIR) 10 mg tablet Take 1 Tab by mouth nightly. 30 Tab 0    diclofenac (VOLTAREN) 1 % gel Apply 2 g to affected area four (4) times daily as needed. 100 g 0     Past Medical History:   Diagnosis Date    Anemia     IBS (irritable bowel syndrome)     Sickle cell trait (HCC)     Thyroid goiter      Social History     Socioeconomic History    Marital status:      Spouse name: Not on file    Number of children: Not on file    Years of education: Not on file    Highest education level: Not on file   Occupational History    Occupation: homemaker   Social Needs    Financial resource strain: Not on file    Food insecurity:     Worry: Not on file     Inability: Not on file    Transportation needs:     Medical: Not on file     Non-medical: Not on file   Tobacco Use    Smoking status: Never Smoker    Smokeless tobacco: Never Used   Substance and Sexual Activity    Alcohol use:  Yes     Alcohol/week: 0.0 oz Comment: Social Drinker    Drug use: No    Sexual activity: Yes     Partners: Male     Comment: birth control patch   Lifestyle    Physical activity:     Days per week: Not on file     Minutes per session: Not on file    Stress: Not on file   Relationships    Social connections:     Talks on phone: Not on file     Gets together: Not on file     Attends Zoroastrianism service: Not on file     Active member of club or organization: Not on file     Attends meetings of clubs or organizations: Not on file     Relationship status: Not on file    Intimate partner violence:     Fear of current or ex partner: Not on file     Emotionally abused: Not on file     Physically abused: Not on file     Forced sexual activity: Not on file   Other Topics Concern     Service Not Asked    Blood Transfusions Not Asked    Caffeine Concern Yes     Comment: 1 cup a day    Occupational Exposure Not Asked    Hobby Hazards Not Asked    Sleep Concern No    Stress Concern No    Weight Concern No    Special Diet Not Asked    Back Care Not Asked    Exercise Yes     Comment: cardio 4 times a week    Bike Helmet Not Asked    Seat Belt Yes    Self-Exams Yes   Social History Narrative    Not on file     Review of Systems   Genitourinary:        Vaginal irritation, denies discharge     /62 (BP 1 Location: Left arm)   Pulse 75   Temp 98.7 °F (37.1 °C) (Oral)   Resp 16   Ht 5' 6\" (1.676 m)   Wt 191 lb (86.6 kg)   LMP 05/16/2019 (Exact Date)   SpO2 99%   BMI 30.83 kg/m²      Physical Exam   Constitutional: She appears well-developed and well-nourished. HENT:   Head: Normocephalic and atraumatic. Neck: Normal range of motion. Neck supple. Cardiovascular: Normal rate, regular rhythm and normal heart sounds. Exam reveals no gallop and no friction rub. No murmur heard. Pulmonary/Chest: Effort normal and breath sounds normal. She has no wheezes. She has no rhonchi. She has no rales.    Genitourinary: Uterus normal. There is tenderness (erythematous ) on the right labia. There is tenderness (erythematous ) on the left labia. Cervix exhibits no motion tenderness and no discharge. Vaginal discharge (scant, white) found. Lymphadenopathy:     She has no cervical adenopathy. ASSESSMENT and PLAN    ICD-10-CM ICD-9-CM    1. Bacterial vaginosis N76.0 616.10     B96.89 041.9      Orders Placed This Encounter    clindamycin (CLEOCIN) 300 mg capsule    fluconazole (DIFLUCAN) 150 mg tablet     I have discussed the diagnosis with the patient and the intended plan as seen in the above orders. The patient has received an after-visit summary and questions were answered concerning future plans. I have discussed medication side effects and warnings with the patient as well. Patient agreeable with above plan and verbalizes understanding. Follow-up and Dispositions    · Return if symptoms worsen or fail to improve.

## 2019-06-06 NOTE — PROGRESS NOTES
Pt is here for vaginal irritation & discharge    1. Have you been to the ER, urgent care clinic since your last visit? Hospitalized since your last visit? Yes [facility] cough & oink eye    2. Have you seen or consulted any other health care providers outside of the [address] [place] [address] since your last visit? Include any pap smears or colon screening.  No

## 2019-07-08 ENCOUNTER — OFFICE VISIT (OUTPATIENT)
Dept: FAMILY MEDICINE CLINIC | Age: 34
End: 2019-07-08

## 2019-07-08 VITALS
HEART RATE: 74 BPM | OXYGEN SATURATION: 97 % | HEIGHT: 66 IN | WEIGHT: 190.2 LBS | BODY MASS INDEX: 30.57 KG/M2 | RESPIRATION RATE: 16 BRPM | DIASTOLIC BLOOD PRESSURE: 65 MMHG | TEMPERATURE: 98.4 F | SYSTOLIC BLOOD PRESSURE: 116 MMHG

## 2019-07-08 DIAGNOSIS — Z00.00 WELL WOMAN EXAM (NO GYNECOLOGICAL EXAM): Primary | ICD-10-CM

## 2019-07-08 DIAGNOSIS — Z13.89 SCREENING FOR CARDIOVASCULAR, RESPIRATORY, AND GENITOURINARY DISEASES: ICD-10-CM

## 2019-07-08 DIAGNOSIS — E04.9 THYROID GOITER: ICD-10-CM

## 2019-07-08 DIAGNOSIS — E66.9 OBESITY, CLASS I, BMI 30-34.9: ICD-10-CM

## 2019-07-08 DIAGNOSIS — Z13.83 SCREENING FOR CARDIOVASCULAR, RESPIRATORY, AND GENITOURINARY DISEASES: ICD-10-CM

## 2019-07-08 DIAGNOSIS — Z13.6 SCREENING FOR CARDIOVASCULAR, RESPIRATORY, AND GENITOURINARY DISEASES: ICD-10-CM

## 2019-07-08 NOTE — PROGRESS NOTES
Pt is here for well woman w/o pap    1. Have you been to the ER, urgent care clinic since your last visit? Hospitalized since your last visit? No    2. Have you seen or consulted any other health care providers outside of the [address] [place] [address] since your last visit? Include any pap smears or colon screening.  No

## 2019-07-08 NOTE — PROGRESS NOTES
Subjective:   27 y.o. female for Well Woman Check. Her gyne and breast care is done elsewhere by her Ob-Gyne physician. Dr. Dylan Bang last year post partum    Patient Active Problem List   Diagnosis Code    Thyroid goiter E04.9    IBS (irritable bowel syndrome) K58.9    Right-sided Bell's palsy G51.0    Advanced directives, counseling/discussion Z71.89     Patient Active Problem List    Diagnosis Date Noted    Advanced directives, counseling/discussion 05/23/2016    Right-sided Bell's palsy 04/24/2015    Thyroid goiter     IBS (irritable bowel syndrome)      Current Outpatient Medications   Medication Sig Dispense Refill    dicyclomine (BENTYL) 10 mg capsule Take 1 Cap by mouth three (3) times daily. Take as needed 90 Cap 1    meloxicam (MOBIC) 15 mg tablet Take 1 Tab by mouth daily. 30 Tab 1    diclofenac (VOLTAREN) 1 % gel Apply 2 g to affected area four (4) times daily as needed. 100 g 0    norelgestromin-ethinyl estradiol Rogene Santa Cruz) 150-35 mcg/24 hr 1 Patch by TransDERmal route Every Saturday. 3 Patch 11    montelukast (SINGULAIR) 10 mg tablet Take 1 Tab by mouth nightly. 30 Tab 0     No Known Allergies  Past Medical History:   Diagnosis Date    Anemia     IBS (irritable bowel syndrome)     Sickle cell trait (HCC)     Thyroid goiter      No past surgical history on file. Family History   Problem Relation Age of Onset    Hypertension Mother     No Known Problems Father     Cancer Maternal Grandmother         Ovarian Cancer    Cancer Paternal Aunt         breast 45s    Cancer Paternal Grandmother         breast 60s/mastectomy 6/15/15     Social History     Tobacco Use    Smoking status: Never Smoker    Smokeless tobacco: Never Used   Substance Use Topics    Alcohol use: Yes     Alcohol/week: 0.0 oz     Comment: Social Drinker      ROS: Feeling generally well. No TIA's or unusual headaches, no dysphagia. No prolonged cough. No dyspnea or chest pain on exertion.   No abdominal pain, change in bowel habits, black or bloody stools. No urinary tract symptoms. No new or unusual musculoskeletal symptoms. Specific concerns today: inquiring if she will need to be retested for lyme's disease after receiving treatment 4 years ago. Objective: The patient appears well, alert, oriented x 3, in no distress. Visit Vitals  /65 (BP 1 Location: Left arm)   Pulse 74   Temp 98.4 °F (36.9 °C) (Oral)   Resp 16   Ht 5' 5.95\" (1.675 m)   Wt 190 lb 3.2 oz (86.3 kg)   LMP 06/14/2019 (Exact Date)   SpO2 97%   BMI 30.75 kg/m²     ENT normal.  Neck supple. No adenopathy. Goiter present. DANIEL. Lungs are clear, good air entry, no wheezes, rhonchi or rales. S1 and S2 normal, no murmurs, regular rate and rhythm. Abdomen soft without tenderness, guarding, mass or organomegaly. Extremities show no edema, normal peripheral pulses. Neurological is normal, no focal findings. Assessment/Plan:   Well Woman  routine labs ordered, call if any problems    ICD-10-CM ICD-9-CM    1. Well woman exam (no gynecological exam) Z00.00 V70.0     [V70.0]   2. Thyroid goiter E04.9 240.9 TSH 3RD GENERATION      T4, FREE      T3 TOTAL   3. Obesity, Class I, BMI 30-34.9 E66.9 278.00 LIPID PANEL      HEMOGLOBIN A1C WITH EAG      METABOLIC PANEL, COMPREHENSIVE   4. Screening for cardiovascular, respiratory, and genitourinary diseases Z13.6 V81.2 LIPID PANEL    Z13.89 V81.6 HEMOGLOBIN A1C WITH EAG    C72.78 V13.4 METABOLIC PANEL, COMPREHENSIVE     I have discussed the diagnosis with the patient and the intended plan as seen in the above orders. The patient has received an after-visit summary and questions were answered concerning future plans. I have discussed medication side effects and warnings with the patient as well. Patient agreeable with above plan and verbalizes understanding. Follow-up and Dispositions    · Return in about 1 year (around 7/8/2020) for CPE with fasting labs.

## 2019-07-08 NOTE — PATIENT INSTRUCTIONS

## 2019-07-15 ENCOUNTER — HOSPITAL ENCOUNTER (OUTPATIENT)
Dept: LAB | Age: 34
Discharge: HOME | End: 2019-07-15
Payer: OTHER GOVERNMENT

## 2019-07-15 DIAGNOSIS — Z13.83 SCREENING FOR CARDIOVASCULAR, RESPIRATORY, AND GENITOURINARY DISEASES: ICD-10-CM

## 2019-07-15 DIAGNOSIS — E04.9 THYROID GOITER: ICD-10-CM

## 2019-07-15 DIAGNOSIS — Z13.89 SCREENING FOR CARDIOVASCULAR, RESPIRATORY, AND GENITOURINARY DISEASES: ICD-10-CM

## 2019-07-15 DIAGNOSIS — E66.9 OBESITY, CLASS I, BMI 30-34.9: ICD-10-CM

## 2019-07-15 DIAGNOSIS — Z13.6 SCREENING FOR CARDIOVASCULAR, RESPIRATORY, AND GENITOURINARY DISEASES: ICD-10-CM

## 2019-07-15 LAB
ALBUMIN SERPL-MCNC: 3.5 G/DL (ref 3.4–5)
ALBUMIN/GLOB SERPL: 1 {RATIO} (ref 0.8–1.7)
ALP SERPL-CCNC: 45 U/L (ref 45–117)
ALT SERPL-CCNC: 27 U/L (ref 13–56)
ANION GAP SERPL CALC-SCNC: 6 MMOL/L (ref 3–18)
AST SERPL-CCNC: 26 U/L (ref 15–37)
BILIRUB SERPL-MCNC: 0.3 MG/DL (ref 0.2–1)
BUN SERPL-MCNC: 11 MG/DL (ref 7–18)
BUN/CREAT SERPL: 13 (ref 12–20)
CALCIUM SERPL-MCNC: 8.4 MG/DL (ref 8.5–10.1)
CHLORIDE SERPL-SCNC: 107 MMOL/L (ref 100–108)
CHOLEST SERPL-MCNC: 162 MG/DL
CO2 SERPL-SCNC: 28 MMOL/L (ref 21–32)
CREAT SERPL-MCNC: 0.83 MG/DL (ref 0.6–1.3)
GLOBULIN SER CALC-MCNC: 3.5 G/DL (ref 2–4)
GLUCOSE SERPL-MCNC: 77 MG/DL (ref 74–99)
HDLC SERPL-MCNC: 62 MG/DL (ref 40–60)
HDLC SERPL: 2.6 {RATIO} (ref 0–5)
LDLC SERPL CALC-MCNC: 84.4 MG/DL (ref 0–100)
LIPID PROFILE,FLP: ABNORMAL
POTASSIUM SERPL-SCNC: 4.2 MMOL/L (ref 3.5–5.5)
PROT SERPL-MCNC: 7 G/DL (ref 6.4–8.2)
SODIUM SERPL-SCNC: 141 MMOL/L (ref 136–145)
T4 FREE SERPL-MCNC: 1 NG/DL (ref 0.7–1.5)
TRIGL SERPL-MCNC: 78 MG/DL (ref ?–150)
TSH SERPL DL<=0.05 MIU/L-ACNC: 0.86 UIU/ML (ref 0.36–3.74)
VLDLC SERPL CALC-MCNC: 15.6 MG/DL

## 2019-07-15 PROCEDURE — 84439 ASSAY OF FREE THYROXINE: CPT

## 2019-07-15 PROCEDURE — 80061 LIPID PANEL: CPT

## 2019-07-15 PROCEDURE — 84480 ASSAY TRIIODOTHYRONINE (T3): CPT

## 2019-07-15 PROCEDURE — 84443 ASSAY THYROID STIM HORMONE: CPT

## 2019-07-15 PROCEDURE — 80053 COMPREHEN METABOLIC PANEL: CPT

## 2019-07-15 PROCEDURE — 83036 HEMOGLOBIN GLYCOSYLATED A1C: CPT

## 2019-07-15 PROCEDURE — 36415 COLL VENOUS BLD VENIPUNCTURE: CPT

## 2019-07-16 LAB
EST. AVERAGE GLUCOSE BLD GHB EST-MCNC: 103 MG/DL
HBA1C MFR BLD: 5.2 % (ref 4.2–5.6)
T3 SERPL-MCNC: 198 NG/DL (ref 71–180)

## 2019-07-30 ENCOUNTER — OFFICE VISIT (OUTPATIENT)
Dept: FAMILY MEDICINE CLINIC | Age: 34
End: 2019-07-30

## 2019-07-30 VITALS
SYSTOLIC BLOOD PRESSURE: 103 MMHG | DIASTOLIC BLOOD PRESSURE: 52 MMHG | OXYGEN SATURATION: 99 % | RESPIRATION RATE: 20 BRPM | BODY MASS INDEX: 30.34 KG/M2 | TEMPERATURE: 98.5 F | HEART RATE: 64 BPM | HEIGHT: 66 IN | WEIGHT: 188.8 LBS

## 2019-07-30 DIAGNOSIS — K64.9 HEMORRHOIDS, UNSPECIFIED HEMORRHOID TYPE: Primary | ICD-10-CM

## 2019-07-30 RX ORDER — HYDROCORTISONE 10 MG/G
CREAM TOPICAL AS NEEDED
Qty: 30 G | Refills: 0 | Status: SHIPPED | OUTPATIENT
Start: 2019-07-30 | End: 2019-08-02 | Stop reason: ALTCHOICE

## 2019-07-30 NOTE — PROGRESS NOTES
Chief Complaint   Patient presents with    Anal Irritation     x 3 weeks when having a bowel movement     1. Have you been to the ER, urgent care clinic since your last visit? Hospitalized since your last visit? No    2. Have you seen or consulted any other health care providers outside of the [address] [place] [address] since your last visit? Include any pap smears or colon screening.  No

## 2019-07-30 NOTE — PATIENT INSTRUCTIONS
Hemorrhoids: Care Instructions  Your Care Instructions    Hemorrhoids are enlarged veins that develop in the anal canal. Bleeding during bowel movements, itching, swelling, and rectal pain are the most common symptoms. They can be uncomfortable at times, but hemorrhoids rarely are a serious problem. You can treat most hemorrhoids with simple changes to your diet and bowel habits. These changes include eating more fiber and not straining to pass stools. Most hemorrhoids do not need surgery or other treatment unless they are very large and painful or bleed a lot. Follow-up care is a key part of your treatment and safety. Be sure to make and go to all appointments, and call your doctor if you are having problems. It's also a good idea to know your test results and keep a list of the medicines you take. How can you care for yourself at home? · Sit in a few inches of warm water (sitz bath) 3 times a day and after bowel movements. The warm water helps with pain and itching. · Put ice on your anal area several times a day for 10 minutes at a time. Put a thin cloth between the ice and your skin. Follow this by placing a warm, wet towel on the area for another 10 to 20 minutes. · Take pain medicines exactly as directed. ? If the doctor gave you a prescription medicine for pain, take it as prescribed. ? If you are not taking a prescription pain medicine, ask your doctor if you can take an over-the-counter medicine. · Keep the anal area clean, but be gentle. Use water and a fragrance-free soap, such as [place], or use baby wipes or medicated pads, such as Tucks. · Wear cotton underwear and loose clothing to decrease moisture in the anal area. · Eat more fiber. Include foods such as whole-grain breads and cereals, raw vegetables, raw and dried fruits, and beans. · Drink plenty of fluids, enough so that your urine is light yellow or clear like water.  If you have kidney, heart, or liver disease and have to limit fluids, talk with your doctor before you increase the amount of fluids you drink. · Use a stool softener that contains bran or psyllium. You can save money by buying bran or psyllium (available in bulk at most health food stores) and sprinkling it on foods or stirring it into fruit juice. Or you can use a product such as Metamucil or Hydrocil. · Practice healthy bowel habits. ? Go to the bathroom as soon as you have the urge. ? Avoid straining to pass stools. Relax and give yourself time to let things happen naturally. ? Do not hold your breath while passing stools. ? Do not read while sitting on the toilet. Get off the toilet as soon as you have finished. · Take your medicines exactly as prescribed. Call your doctor if you think you are having a problem with your medicine. When should you call for help? Call 911 anytime you think you may need emergency care. For example, call if:    · You pass maroon or very bloody stools.    Call your doctor now or seek immediate medical care if:    · You have increased pain.     · You have increased bleeding.    Watch closely for changes in your health, and be sure to contact your doctor if:    · Your symptoms have not improved after 3 or 4 days. Where can you learn more? Go to http://colin-belkis.info/. Enter F228 in the search box to learn more about \"Hemorrhoids: Care Instructions. \"  Current as of: November 7, 2018  Content Version: 12.1  © 5079-9914 [employer]. Care instructions adapted under license by [employer] (which disclaims liability or warranty for this information). If you have questions about a medical condition or this instruction, always ask your healthcare professional. [place] [address] any warranty or liability for your use of this information.

## 2019-07-31 ENCOUNTER — TELEPHONE (OUTPATIENT)
Dept: FAMILY MEDICINE CLINIC | Age: 34
End: 2019-07-31

## 2019-07-31 DIAGNOSIS — K64.9 HEMORRHOIDS, UNSPECIFIED HEMORRHOID TYPE: Primary | ICD-10-CM

## 2019-07-31 NOTE — TELEPHONE ENCOUNTER
PT is calling back in to se if the new script had been put in for her yet, pt is asking for the nurse to give her a call once it has been done. Please Advise.

## 2019-07-31 NOTE — PROGRESS NOTES
HPI  Stephanie Genao is a 27 y.o. female who presents today for rectal discomfort, irritation and burning. Pt has had this discomfort for 3 weeks and notes it occurs after she has a bowel movement. She first thought it was the bath tissue that she use irritating her rectal area. Pt started using OTC Preparation H wipes and notes minimal improvement to symptoms. Pt denies blood in stool but notes she has irritable bowel syndrome and had been having some occurrences of constipation prior to onset of rectal pain. Current Outpatient Medications   Medication Sig Dispense Refill    hydrocortisone (PROCTOCORT) 1 % rectal cream Insert  into rectum as needed for Hemorrhoids. 30 g 0    dicyclomine (BENTYL) 10 mg capsule Take 1 Cap by mouth three (3) times daily. Take as needed 90 Cap 1    meloxicam (MOBIC) 15 mg tablet Take 1 Tab by mouth daily. 30 Tab 1    norelgestromin-ethinyl estradiol Lollie Huguenin) 150-35 mcg/24 hr 1 Patch by TransDERmal route Every Saturday. 3 Patch 11    montelukast (SINGULAIR) 10 mg tablet Take 1 Tab by mouth nightly. 30 Tab 0    diclofenac (VOLTAREN) 1 % gel Apply 2 g to affected area four (4) times daily as needed. 100 g 0      No Known Allergies    SUBJECTIVE:  Review of Systems   Constitutional: Negative for chills, fever and malaise/fatigue. Respiratory: Negative for shortness of breath. Cardiovascular: Negative for chest pain, palpitations and leg swelling. Gastrointestinal: Negative for abdominal pain, blood in stool, diarrhea, nausea and vomiting. Rectal pain after bowel movements   Musculoskeletal: Negative for myalgias. Neurological: Negative for dizziness, tingling, sensory change and headaches.         OBJECTIVE:  Visit Vitals  /52 (BP 1 Location: Left arm, BP Patient Position: Sitting)   Pulse 64   Temp 98.5 °F (36.9 °C) (Oral)   Resp 20   Ht 5' 5.95\" (1.675 m)   Wt 188 lb 12.8 oz (85.6 kg)   LMP 07/11/2019 (Exact Date)   SpO2 99%   BMI 30.52 kg/m²      I have reviewed/discussed the above normal BMI with the patient. I have recommended the following interventions: dietary management education, guidance, and counseling, encourage exercise and monitor weight . Physical Exam   Constitutional: She is oriented to person, place, and time and well-developed, well-nourished, and in no distress. Cardiovascular: Normal rate, regular rhythm and normal heart sounds. Pulmonary/Chest: Effort normal and breath sounds normal. She has no wheezes. She has no rales. She exhibits no tenderness. Genitourinary: Rectal exam shows external hemorrhoid. Neurological: She is alert and oriented to person, place, and time. Gait normal.   Skin: Skin is warm and dry. No erythema. Nursing note and vitals reviewed. ASSESSMENT:  Diagnoses and all orders for this visit:    1. Hemorrhoids, unspecified hemorrhoid type  -     hydrocortisone (PROCTOCORT) 1 % rectal cream; Insert  into rectum as needed for Hemorrhoids. PLAN:  Start Hydrocortisone rectal cream  Encouraged patient to increase fiber intake and water iintake    I have discussed the diagnosis with the patient and the intended plan as seen in the above orders. The patient has received an after-visit summary and questions were answered concerning future plans. I have discussed medication side effects and warnings with the patient as well. Patient will call for further questions. Follow-up and Dispositions    · Return in about 2 weeks (around 8/13/2019), or if symptoms worsen or fail to improve.        Florentin Costello NP

## 2019-07-31 NOTE — TELEPHONE ENCOUNTER
Medication patient was given yesterday her insurance does not cover.  Would like to know if she can have something else prescribed and sent to her pharmacy    pls advise

## 2019-08-02 RX ORDER — HYDROCORTISONE 25 MG/G
CREAM TOPICAL 4 TIMES DAILY
Qty: 30 G | Refills: 0 | Status: SHIPPED | OUTPATIENT
Start: 2019-08-02 | End: 2020-08-11 | Stop reason: ALTCHOICE

## 2019-08-02 NOTE — TELEPHONE ENCOUNTER
Attempted to contact patient about new prescription that was sent into the pharmacy. Left message with call back number.

## 2019-08-02 NOTE — TELEPHONE ENCOUNTER
PT is calling back bc her insurance would not cover the medication lans gave her and she needs something else put in. Pt is waiting for a call back from the nurse she has been waiting since Wednesday. Pt stated her phone does not work at her job so she is asking to try her at her desk phone if the nurse cannot reach her there then to leave a message on her cell. Please advise.        [phone]

## 2019-08-05 DIAGNOSIS — K64.9 HEMORRHOIDS, UNSPECIFIED HEMORRHOID TYPE: ICD-10-CM

## 2019-08-15 ENCOUNTER — OFFICE VISIT (OUTPATIENT)
Dept: FAMILY MEDICINE CLINIC | Age: 34
End: 2019-08-15

## 2019-08-15 VITALS
OXYGEN SATURATION: 99 % | TEMPERATURE: 98.7 F | BODY MASS INDEX: 31.34 KG/M2 | HEIGHT: 66 IN | DIASTOLIC BLOOD PRESSURE: 64 MMHG | RESPIRATION RATE: 16 BRPM | SYSTOLIC BLOOD PRESSURE: 123 MMHG | WEIGHT: 195 LBS | HEART RATE: 76 BPM

## 2019-08-15 DIAGNOSIS — K62.5 RECTAL BLEEDING: Primary | ICD-10-CM

## 2019-08-15 DIAGNOSIS — K58.1 IRRITABLE BOWEL SYNDROME WITH CONSTIPATION: ICD-10-CM

## 2019-08-15 NOTE — PROGRESS NOTES
1. Have you been to the ER, urgent care clinic since your last visit? Hospitalized since your last visit? No    2. Have you seen or consulted any other health care providers outside of the [address] [place] [address] since your last visit? Include any pap smears or colon screening.  No

## 2019-08-15 NOTE — PROGRESS NOTES
HISTORY OF PRESENT ILLNESS  Susan Cruz is a 27 y.o. female. Patient presents for a possible hemorrhoid. HPI  Pt was seen last week for a hemorrhoid and she has been using the cream as prescribed but sees no difference. Review of Systems   Constitutional: Negative. Respiratory: Negative. Cardiovascular: Negative. Gastrointestinal: Positive for blood in stool and constipation. Negative for abdominal pain, heartburn, nausea and vomiting. Genitourinary: Negative. Visit Vitals  /64 (BP 1 Location: Right arm, BP Patient Position: Sitting)   Pulse 76   Temp 98.7 °F (37.1 °C) (Oral)   Resp 16   Ht 5' 5.95\" (1.675 m)   Wt 195 lb (88.5 kg)   LMP 08/09/2019 (Exact Date)   SpO2 99%   BMI 31.52 kg/m²     Physical Exam   Constitutional: She appears well-developed. No distress. Cardiovascular: Normal rate, regular rhythm and normal heart sounds. No murmur heard. Pulmonary/Chest: Effort normal and breath sounds normal. No respiratory distress. She has no wheezes. She has no rales. Abdominal: Soft. Bowel sounds are normal. She exhibits no distension and no mass. There is no tenderness. There is no rebound and no guarding. Genitourinary: Rectal exam shows fissure and tenderness. Rectal exam shows no external hemorrhoid, no internal hemorrhoid (did not see one using an anal scope but inflammations was noted on superior wall of the rectum), no mass, anal tone normal and guaiac negative stool. ASSESSMENT and PLAN    ICD-10-CM ICD-9-CM    1. Rectal bleeding K62.5 569.3 REFERRAL TO GASTROENTEROLOGY   2. Irritable bowel syndrome with constipation K58.1 564.1 REFERRAL TO GASTROENTEROLOGY     PLAN:  Pt is to continue cream.  Pt ref to GI for further eval.  Pt is to call with any concerns. I have discussed the diagnosis with the patient and the intended plan as seen in the above orders. The patient has received an after-visit summary and questions were answered concerning future plans.   I have discussed medication side effects and warnings with the patient as well. Patient will call for further questions.

## 2019-12-10 ENCOUNTER — OFFICE VISIT (OUTPATIENT)
Dept: FAMILY MEDICINE CLINIC | Age: 34
End: 2019-12-10

## 2019-12-10 VITALS
HEART RATE: 110 BPM | WEIGHT: 192.6 LBS | SYSTOLIC BLOOD PRESSURE: 128 MMHG | HEIGHT: 66 IN | BODY MASS INDEX: 30.95 KG/M2 | OXYGEN SATURATION: 95 % | DIASTOLIC BLOOD PRESSURE: 82 MMHG | TEMPERATURE: 97.8 F | RESPIRATION RATE: 18 BRPM

## 2019-12-10 DIAGNOSIS — R21 RASH: Primary | ICD-10-CM

## 2019-12-10 RX ORDER — NYSTATIN 100000 U/G
CREAM TOPICAL 2 TIMES DAILY
Qty: 15 G | Refills: 0 | Status: SHIPPED | OUTPATIENT
Start: 2019-12-10 | End: 2020-08-29

## 2019-12-10 NOTE — PROGRESS NOTES
HPI:  Intermittent pruritic rash gluteal fold x 12 mos w/o d/c, fever; worsens with HC cream      ROS is otherwise negative. Past Medical History:   Diagnosis Date    Anemia     IBS (irritable bowel syndrome)     Sickle cell trait ([facility] [address]     Thyroid goiter        No past surgical history on file. Social History     Socioeconomic History    Marital status:      Spouse name: Not on file    Number of children: Not on file    Years of education: Not on file    Highest education level: Not on file   Occupational History    Occupation: homemaker   Social Needs    Financial resource strain: Not on file    Food insecurity:     Worry: Not on file     Inability: Not on file    Transportation needs:     Medical: Not on file     Non-medical: Not on file   Tobacco Use    Smoking status: Never Smoker    Smokeless tobacco: Never Used   Substance and Sexual Activity    Alcohol use:  Yes     Alcohol/week: 0.0 standard drinks     Comment: Social Drinker    Drug use: No    Sexual activity: Yes     Partners: Male     Birth control/protection: Patch     Comment: birth control patch   Lifestyle    Physical activity:     Days per week: Not on file     Minutes per session: Not on file    Stress: Not on file   Relationships    Social connections:     Talks on phone: Not on file     Gets together: Not on file     Attends Restorationism service: Not on file     Active member of club or organization: Not on file     Attends meetings of clubs or organizations: Not on file     Relationship status: Not on file    Intimate partner violence:     Fear of current or ex partner: Not on file     Emotionally abused: Not on file     Physically abused: Not on file     Forced sexual activity: Not on file   Other Topics Concern     Service Not Asked    Blood Transfusions Not Asked    Caffeine Concern Yes     Comment: 1 cup a day    Occupational Exposure Not Asked    Hobby Hazards Not Asked    Sleep Concern No    Stress Concern No    Weight Concern No    Special Diet Not Asked    Back Care Not Asked    Exercise No    Bike Helmet Not Asked    Seat Belt Yes    Self-Exams Yes   Social History Narrative    Not on file       No Known Allergies    Family History   Problem Relation Age of Onset    Hypertension Mother     No Known Problems Father     Cancer Maternal Grandmother         Ovarian Cancer    Cancer Paternal Aunt         breast 45s    Cancer Paternal Grandmother         breast 60s/mastectomy 6/15/15    Other Daughter         neutropenia, jujeno web       Current Outpatient Medications   Medication Sig Dispense Refill    hydrocortisone (PROCTOZONE-HC) 2.5 % rectal cream Insert  into rectum four (4) times daily. 30 g 0    montelukast (SINGULAIR) 10 mg tablet Take 1 Tab by mouth nightly. 30 Tab 0    dicyclomine (BENTYL) 10 mg capsule Take 1 Cap by mouth three (3) times daily. Take as needed 90 Cap 1    meloxicam (MOBIC) 15 mg tablet Take 1 Tab by mouth daily. 30 Tab 1    diclofenac (VOLTAREN) 1 % gel Apply 2 g to affected area four (4) times daily as needed. 100 g 0    norelgestromin-ethinyl estradiol Frannie Angst) 150-35 mcg/24 hr 1 Patch by TransDERmal route Every Saturday. 3 Patch 11           Visit Vitals  /82 (BP 1 Location: Right arm, BP Patient Position: Sitting)   Pulse (!) 110   Temp 97.8 °F (36.6 °C) (Oral)   Resp 18   Ht 5' 5.95\" (1.675 m)   Wt 192 lb 9.6 oz (87.4 kg)   LMP 11/29/2019 (Exact Date)   SpO2 95%   BMI 31.13 kg/m²       PE  heavy in NAD  HEENT:  OP: clear. Neck: supple  Chest: clear. CV: RRR w/o m,r,g  Abd: soft, NT  Ext: w/o edema. Skin: slight redness gluteal fold w/o d/c or discrete mass    Assessment and Plan    Encounter Diagnoses   Name Primary?     Rash Yes     Rash - probably fungal  Attempt to stay dry  Nystatin cream bid prn  F/U worsening or unimproved 10 days  OV 6 mos or prn  I have explained plan to patient and the patient verbalizes understanding

## 2019-12-10 NOTE — PATIENT INSTRUCTIONS
Rash: Care Instructions  Your Care Instructions  A rash is any irritation or inflammation of the skin. Rashes have many possible causes, including allergy, infection, illness, heat, and emotional stress. Follow-up care is a key part of your treatment and safety. Be sure to make and go to all appointments, and call your doctor if you are having problems. It's also a good idea to know your test results and keep a list of the medicines you take. How can you care for yourself at home? · Wash the area with water only. Soap can make dryness and itching worse. Pat dry. · Put cold, wet cloths on the rash to reduce itching. · Keep cool, and stay out of the sun. · Leave the rash open to the air as much of the time as possible. · Sometimes petroleum jelly (Vaseline) can help relieve the discomfort caused by a rash. A moisturizing lotion, such as Cetaphil, also may help. Calamine lotion may help for rashes caused by contact with something (such as a plant or soap) that irritated the skin. Use it 3 or 4 times a day. · If your doctor prescribed a cream, use it as directed. If your doctor prescribed medicine, take it exactly as directed. · If your rash itches so badly that it interferes with your normal activities, take an over-the-counter antihistamine, such as diphenhydramine (Benadryl) or loratadine (Claritin). Read and follow all instructions on the label. When should you call for help? Call your doctor now or seek immediate medical care if:    · You have signs of infection, such as:  ? Increased pain, swelling, warmth, or redness. ? Red streaks leading from the area. ? Pus draining from the area. ? A fever.     · You have joint pain along with the rash.    Watch closely for changes in your health, and be sure to contact your doctor if:    · Your rash is changing or getting worse.  For example, call if you have pain along with the rash, the rash is spreading, or you have new blisters.     · You do not get better after 1 week. Where can you learn more? Go to http://colin-belkis.info/. Enter P971 in the search box to learn more about \"Rash: Care Instructions. \"  Current as of: April 1, 2019  Content Version: 12.2  © 0743-4933 [employer]. Care instructions adapted under license by [employer] (which disclaims liability or warranty for this information). If you have questions about a medical condition or this instruction, always ask your healthcare professional. [address] any warranty or liability for your use of this information.

## 2020-08-11 ENCOUNTER — OFFICE VISIT (OUTPATIENT)
Dept: FAMILY MEDICINE CLINIC | Age: 35
End: 2020-08-11

## 2020-08-11 ENCOUNTER — HOSPITAL ENCOUNTER (OUTPATIENT)
Dept: LAB | Age: 35
Discharge: HOME | End: 2020-08-11
Payer: OTHER GOVERNMENT

## 2020-08-11 VITALS
SYSTOLIC BLOOD PRESSURE: 119 MMHG | DIASTOLIC BLOOD PRESSURE: 66 MMHG | TEMPERATURE: 97.6 F | HEART RATE: 82 BPM | BODY MASS INDEX: 30.05 KG/M2 | HEIGHT: 66 IN | WEIGHT: 187 LBS | RESPIRATION RATE: 18 BRPM | OXYGEN SATURATION: 98 %

## 2020-08-11 DIAGNOSIS — Z13.6 SCREENING FOR CARDIOVASCULAR, RESPIRATORY, AND GENITOURINARY DISEASES: ICD-10-CM

## 2020-08-11 DIAGNOSIS — R94.6 THYROID FUNCTION TEST ABNORMAL: ICD-10-CM

## 2020-08-11 DIAGNOSIS — Z00.00 WELL WOMAN EXAM (NO GYNECOLOGICAL EXAM): Primary | ICD-10-CM

## 2020-08-11 DIAGNOSIS — Z13.89 SCREENING FOR CARDIOVASCULAR, RESPIRATORY, AND GENITOURINARY DISEASES: ICD-10-CM

## 2020-08-11 DIAGNOSIS — Z13.83 SCREENING FOR CARDIOVASCULAR, RESPIRATORY, AND GENITOURINARY DISEASES: ICD-10-CM

## 2020-08-11 LAB
ALBUMIN SERPL-MCNC: 3.5 G/DL (ref 3.4–5)
ALBUMIN/GLOB SERPL: 0.9 {RATIO} (ref 0.8–1.7)
ALP SERPL-CCNC: 50 U/L (ref 45–117)
ALT SERPL-CCNC: 30 U/L (ref 13–56)
ANION GAP SERPL CALC-SCNC: 3 MMOL/L (ref 3–18)
AST SERPL-CCNC: 21 U/L (ref 10–38)
BILIRUB SERPL-MCNC: 0.4 MG/DL (ref 0.2–1)
BUN SERPL-MCNC: 13 MG/DL (ref 7–18)
BUN/CREAT SERPL: 17 (ref 12–20)
CALCIUM SERPL-MCNC: 9 MG/DL (ref 8.5–10.1)
CHLORIDE SERPL-SCNC: 103 MMOL/L (ref 100–111)
CHOLEST SERPL-MCNC: 145 MG/DL
CO2 SERPL-SCNC: 30 MMOL/L (ref 21–32)
CREAT SERPL-MCNC: 0.75 MG/DL (ref 0.6–1.3)
EST. AVERAGE GLUCOSE BLD GHB EST-MCNC: 111 MG/DL
GLOBULIN SER CALC-MCNC: 3.9 G/DL (ref 2–4)
GLUCOSE SERPL-MCNC: 82 MG/DL (ref 74–99)
HBA1C MFR BLD: 5.5 % (ref 4.2–5.6)
HDLC SERPL-MCNC: 60 MG/DL (ref 40–60)
HDLC SERPL: 2.4 {RATIO} (ref 0–5)
LDLC SERPL CALC-MCNC: 70.8 MG/DL (ref 0–100)
LIPID PROFILE,FLP: NORMAL
POTASSIUM SERPL-SCNC: 4.1 MMOL/L (ref 3.5–5.5)
PROT SERPL-MCNC: 7.4 G/DL (ref 6.4–8.2)
SODIUM SERPL-SCNC: 136 MMOL/L (ref 136–145)
T4 FREE SERPL-MCNC: 1 NG/DL (ref 0.7–1.5)
TRIGL SERPL-MCNC: 71 MG/DL (ref ?–150)
TSH SERPL DL<=0.05 MIU/L-ACNC: 0.47 UIU/ML (ref 0.36–3.74)
VLDLC SERPL CALC-MCNC: 14.2 MG/DL

## 2020-08-11 PROCEDURE — 80061 LIPID PANEL: CPT

## 2020-08-11 PROCEDURE — 80053 COMPREHEN METABOLIC PANEL: CPT

## 2020-08-11 PROCEDURE — 84480 ASSAY TRIIODOTHYRONINE (T3): CPT

## 2020-08-11 PROCEDURE — 84439 ASSAY OF FREE THYROXINE: CPT

## 2020-08-11 PROCEDURE — 36415 COLL VENOUS BLD VENIPUNCTURE: CPT

## 2020-08-11 PROCEDURE — 83036 HEMOGLOBIN GLYCOSYLATED A1C: CPT

## 2020-08-11 PROCEDURE — 84443 ASSAY THYROID STIM HORMONE: CPT

## 2020-08-11 NOTE — PROGRESS NOTES
Subjective:   38 y.o. female for Well Woman Check. Her gyne and breast care is done elsewhere by her Ob-Gyne physician. Patient followed by Dr. Shilo Bejarano, last visit 12/2019, normal per patient. Patient Active Problem List   Diagnosis Code    Thyroid goiter E04.9    IBS (irritable bowel syndrome) K58.9    Right-sided Bell's palsy G51.0    Advanced directives, counseling/discussion Z71.89     Patient Active Problem List    Diagnosis Date Noted    Advanced directives, counseling/discussion 05/23/2016    Right-sided Bell's palsy 04/24/2015    Thyroid goiter     IBS (irritable bowel syndrome)      Current Outpatient Medications   Medication Sig Dispense Refill    meloxicam (MOBIC) 15 mg tablet Take 1 Tab by mouth daily. 30 Tab 0    dicyclomine (BentyL) 10 mg capsule Take 1 Cap by mouth three (3) times daily. Take as needed 90 Cap 0    nystatin (MYCOSTATIN) topical cream Apply  to affected area two (2) times a day. 15 g 0    hydrocortisone (PROCTOZONE-HC) 2.5 % rectal cream Insert  into rectum four (4) times daily. 30 g 0    diclofenac (VOLTAREN) 1 % gel Apply 2 g to affected area four (4) times daily as needed. 100 g 0    norelgestromin-ethinyl estradiol Cris Safer) 150-35 mcg/24 hr 1 Patch by TransDERmal route Every Saturday. 3 Patch 11     No Known Allergies  Past Medical History:   Diagnosis Date    Anemia     IBS (irritable bowel syndrome)     Sickle cell trait (HCC)     Thyroid goiter      No past surgical history on file. Family History   Problem Relation Age of Onset    Hypertension Mother     No Known Problems Father     Cancer Maternal Grandmother         Ovarian Cancer    Cancer Paternal Aunt         breast 45s    Cancer Paternal Grandmother         breast 60s/mastectomy 6/15/15    Other Daughter         neutropenia, jujeno web     Social History     Tobacco Use    Smoking status: Never Smoker    Smokeless tobacco: Never Used   Substance Use Topics    Alcohol use:  Yes Alcohol/week: 0.0 standard drinks     Comment: Social Drinker      Lab Results   Component Value Date/Time    WBC 6.9 07/22/2015 02:47 PM    HGB 11.9 07/22/2015 02:47 PM    HCT 36.3 07/22/2015 02:47 PM    PLATELET 140 61/03/6175 02:47 PM    MCV 77 (L) 07/22/2015 02:47 PM     Lab Results   Component Value Date/Time    Cholesterol, total 162 07/15/2019 07:33 AM    HDL Cholesterol 62 (H) 07/15/2019 07:33 AM    LDL, calculated 84.4 07/15/2019 07:33 AM    Triglyceride 78 07/15/2019 07:33 AM    CHOL/HDL Ratio 2.6 07/15/2019 07:33 AM     Lab Results   Component Value Date/Time    Sodium 141 07/15/2019 07:33 AM    Potassium 4.2 07/15/2019 07:33 AM    Chloride 107 07/15/2019 07:33 AM    CO2 28 07/15/2019 07:33 AM    Anion gap 6 07/15/2019 07:33 AM    Glucose 77 07/15/2019 07:33 AM    BUN 11 07/15/2019 07:33 AM    Creatinine 0.83 07/15/2019 07:33 AM    BUN/Creatinine ratio 13 07/15/2019 07:33 AM    GFR est AA >60 07/15/2019 07:33 AM    GFR est non-AA >60 07/15/2019 07:33 AM    Calcium 8.4 (L) 07/15/2019 07:33 AM      Lab Results   Component Value Date/Time    Hemoglobin A1c 5.2 07/15/2019 07:33 AM         ROS: Feeling generally well. No TIA's or unusual headaches, no dysphagia. No prolonged cough. No dyspnea or chest pain on exertion. No abdominal pain, change in bowel habits, black or bloody stools. No urinary tract symptoms. No new or unusual musculoskeletal symptoms. Specific concerns today: patient states she has felt fatigued over the last 1-2 months. Comments her face has facial itching which has been present prior to [place] pandemic. Patient does use clean and clear facial wash without improvement. Objective: The patient appears well, alert, oriented x 3, in no distress.   Visit Vitals  /66 (BP 1 Location: Left arm, BP Patient Position: Sitting)   Pulse 82   Temp 97.6 °F (36.4 °C) (Temporal)   Resp 18   Ht 5' 5.95\" (1.675 m)   Wt 187 lb (84.8 kg)   SpO2 98%   BMI 30.23 kg/m²     ENT normal.  Neck supple. No adenopathy or thyromegaly. DANIEL. Lungs are clear, good air entry, no wheezes, rhonchi or rales. S1 and S2 normal, no murmurs, regular rate and rhythm. Abdomen soft without tenderness, guarding, mass or organomegaly. Extremities show no edema, normal peripheral pulses. Neurological is normal, no focal findings. Breast and Pelvic exams are deferred. Assessment/Plan:   Well Woman  routine labs ordered    ICD-10-CM ICD-9-CM    1. Well woman exam (no gynecological exam)  Z00.00 V70.0     [V70.0]   2. Screening for cardiovascular, respiratory, and genitourinary diseases  Z13.6 V81.2 HEMOGLOBIN A1C WITH EAG    Z13.89 V81.6 LIPID PANEL    A26.95 E16.1 METABOLIC PANEL, COMPREHENSIVE   3. Thyroid function test abnormal  R94.6 794.5 TSH 3RD GENERATION      T4, FREE      T3 TOTAL      REFERRAL TO ENDOCRINOLOGY     Orders Placed This Encounter    HEMOGLOBIN A1C WITH EAG    LIPID PANEL    METABOLIC PANEL, COMPREHENSIVE    TSH 3RD GENERATION    T4, FREE    T3 TOTAL    REFERRAL TO ENDOCRINOLOGY       I have discussed the diagnosis with the patient and the intended plan as seen in the above orders. The patient has received an after-visit summary and questions were answered concerning future plans. I have discussed medication side effects and warnings with the patient as well. Patient agreeable with above plan and verbalizes understanding. Follow-up and Dispositions    · Return in about 1 year (around 8/11/2021) for CPE.

## 2020-08-12 LAB — T3 SERPL-MCNC: 178 NG/DL (ref 71–180)

## 2020-08-29 ENCOUNTER — HOSPITAL ENCOUNTER (EMERGENCY)
Age: 35
Discharge: HOME | End: 2020-08-29
Attending: EMERGENCY MEDICINE
Payer: OTHER GOVERNMENT

## 2020-08-29 VITALS
TEMPERATURE: 98.9 F | OXYGEN SATURATION: 100 % | SYSTOLIC BLOOD PRESSURE: 117 MMHG | DIASTOLIC BLOOD PRESSURE: 49 MMHG | RESPIRATION RATE: 24 BRPM | HEIGHT: 66 IN | WEIGHT: 181.38 LBS | HEART RATE: 60 BPM | BODY MASS INDEX: 29.15 KG/M2

## 2020-08-29 DIAGNOSIS — R79.89 POSITIVE D DIMER: Primary | ICD-10-CM

## 2020-08-29 LAB
ANION GAP SERPL CALC-SCNC: 4 MMOL/L (ref 3–18)
APTT PPP: 26.3 SEC (ref 23–36.4)
BASOPHILS # BLD: 0 K/UL (ref 0–0.1)
BASOPHILS NFR BLD: 0 % (ref 0–2)
BUN SERPL-MCNC: 12 MG/DL (ref 7–18)
BUN/CREAT SERPL: 14 (ref 12–20)
CALCIUM SERPL-MCNC: 8.9 MG/DL (ref 8.5–10.1)
CHLORIDE SERPL-SCNC: 106 MMOL/L (ref 100–111)
CO2 SERPL-SCNC: 29 MMOL/L (ref 21–32)
CREAT SERPL-MCNC: 0.87 MG/DL (ref 0.6–1.3)
D DIMER PPP FEU-MCNC: 0.78 UG/ML(FEU)
DIFFERENTIAL METHOD BLD: NORMAL
EOSINOPHIL # BLD: 0.1 K/UL (ref 0–0.4)
EOSINOPHIL NFR BLD: 2 % (ref 0–5)
ERYTHROCYTE [DISTWIDTH] IN BLOOD BY AUTOMATED COUNT: 14.1 % (ref 11.6–14.5)
GLUCOSE SERPL-MCNC: 95 MG/DL (ref 74–99)
HCT VFR BLD AUTO: 38.9 % (ref 35–45)
HGB BLD-MCNC: 13.1 G/DL (ref 12–16)
INR PPP: 1 (ref 0.8–1.2)
LYMPHOCYTES # BLD: 2 K/UL (ref 0.9–3.6)
LYMPHOCYTES NFR BLD: 29 % (ref 21–52)
MCH RBC QN AUTO: 26.3 PG (ref 24–34)
MCHC RBC AUTO-ENTMCNC: 33.7 G/DL (ref 31–37)
MCV RBC AUTO: 78 FL (ref 74–97)
MONOCYTES # BLD: 0.7 K/UL (ref 0.05–1.2)
MONOCYTES NFR BLD: 10 % (ref 3–10)
NEUTS SEG # BLD: 4.1 K/UL (ref 1.8–8)
NEUTS SEG NFR BLD: 59 % (ref 40–73)
PLATELET # BLD AUTO: 247 K/UL (ref 135–420)
PMV BLD AUTO: 11.7 FL (ref 9.2–11.8)
POTASSIUM SERPL-SCNC: 4.3 MMOL/L (ref 3.5–5.5)
PROTHROMBIN TIME: 12.8 SEC (ref 11.5–15.2)
RBC # BLD AUTO: 4.99 M/UL (ref 4.2–5.3)
SODIUM SERPL-SCNC: 139 MMOL/L (ref 136–145)
WBC # BLD AUTO: 6.9 K/UL (ref 4.6–13.2)

## 2020-08-29 PROCEDURE — 74011250636 HC RX REV CODE- 250/636: Performed by: EMERGENCY MEDICINE

## 2020-08-29 PROCEDURE — 85610 PROTHROMBIN TIME: CPT

## 2020-08-29 PROCEDURE — 80048 BASIC METABOLIC PNL TOTAL CA: CPT

## 2020-08-29 PROCEDURE — 85379 FIBRIN DEGRADATION QUANT: CPT

## 2020-08-29 PROCEDURE — 96372 THER/PROPH/DIAG INJ SC/IM: CPT

## 2020-08-29 PROCEDURE — 85025 COMPLETE CBC W/AUTO DIFF WBC: CPT

## 2020-08-29 PROCEDURE — 85730 THROMBOPLASTIN TIME PARTIAL: CPT

## 2020-08-29 PROCEDURE — 99283 EMERGENCY DEPT VISIT LOW MDM: CPT

## 2020-08-29 RX ORDER — ENOXAPARIN SODIUM 100 MG/ML
80 INJECTION SUBCUTANEOUS
Status: COMPLETED | OUTPATIENT
Start: 2020-08-29 | End: 2020-08-29

## 2020-08-29 RX ADMIN — ENOXAPARIN SODIUM 80 MG: 80 INJECTION SUBCUTANEOUS at 22:59

## 2020-08-29 NOTE — ED TRIAGE NOTES
Pt reports started working out this past week and since Wednesday has felt pain to lower right leg. Also tingling to right great toe. Today began having sharp, shooting pain to right upper back area. Denies shortness of breath.

## 2020-08-30 ENCOUNTER — APPOINTMENT (OUTPATIENT)
Dept: VASCULAR SURGERY | Age: 35
End: 2020-08-30
Attending: EMERGENCY MEDICINE
Payer: OTHER GOVERNMENT

## 2020-08-30 ENCOUNTER — HOSPITAL ENCOUNTER (EMERGENCY)
Age: 35
Discharge: HOME | End: 2020-08-30
Attending: EMERGENCY MEDICINE
Payer: OTHER GOVERNMENT

## 2020-08-30 VITALS
HEIGHT: 66 IN | TEMPERATURE: 98.2 F | SYSTOLIC BLOOD PRESSURE: 103 MMHG | DIASTOLIC BLOOD PRESSURE: 61 MMHG | WEIGHT: 181 LBS | BODY MASS INDEX: 29.09 KG/M2 | RESPIRATION RATE: 20 BRPM | OXYGEN SATURATION: 100 % | HEART RATE: 69 BPM

## 2020-08-30 DIAGNOSIS — M25.561 ARTHRALGIA OF RIGHT LOWER LEG: Primary | ICD-10-CM

## 2020-08-30 PROCEDURE — 93971 EXTREMITY STUDY: CPT

## 2020-08-30 PROCEDURE — 99282 EMERGENCY DEPT VISIT SF MDM: CPT

## 2020-08-30 NOTE — ED PROVIDER NOTES
HPI patient is a 69-year-old female who was working out couple days ago. Since then she has had some upper back muscle ache and right leg discomfort over her right lateral surface. Patient is worried about her  having a DVT because she is on birth control pills. No shortness of breath no edema of her legs, no history of DVT. Past Medical History:   Diagnosis Date    Anemia     IBS (irritable bowel syndrome)     Sickle cell trait (HCC)     Thyroid goiter     Ulnar nerve entrapment syndrome        History reviewed. No pertinent surgical history. Family History:   Problem Relation Age of Onset    Hypertension Mother     No Known Problems Father     Cancer Maternal Grandmother         Ovarian Cancer    Cancer Paternal Aunt         breast 45s    Cancer Paternal Grandmother         breast 60s/mastectomy 6/15/15    Other Daughter         neutropenia, jujeno web       Social History     Socioeconomic History    Marital status:      Spouse name: Not on file    Number of children: Not on file    Years of education: Not on file    Highest education level: Not on file   Occupational History    Occupation: homemaker   Social Needs    Financial resource strain: Not on file    Food insecurity     Worry: Not on file     Inability: Not on file   [facility] needs     Medical: Not on file     Non-medical: Not on file   Tobacco Use    Smoking status: Never Smoker    Smokeless tobacco: Never Used   Substance and Sexual Activity    Alcohol use:  Yes     Alcohol/week: 0.0 standard drinks     Comment: Social Drinker    Drug use: No    Sexual activity: Yes     Partners: Male     Birth control/protection: Patch     Comment: birth control patch   Lifestyle    Physical activity     Days per week: Not on file     Minutes per session: Not on file    Stress: Not on file   Relationships    Social connections     Talks on phone: Not on file     Gets together: Not on file     Attends Christian service: Not on file     Active member of club or organization: Not on file     Attends meetings of clubs or organizations: Not on file     Relationship status: Not on file    Intimate partner violence     Fear of current or ex partner: Not on file     Emotionally abused: Not on file     Physically abused: Not on file     Forced sexual activity: Not on file   Other Topics Concern     Service Not Asked    Blood Transfusions Not Asked    Caffeine Concern Yes     Comment: 1 cup a day    Occupational Exposure Not Asked    Hobby Hazards Not Asked    Sleep Concern No    Stress Concern No    Weight Concern No    Special Diet Not Asked    Back Care Not Asked    Exercise No    Bike Helmet Not Asked    Seat Belt Yes    Self-Exams Yes   Social History Narrative    Not on file         ALLERGIES: Patient has no known allergies. Review of Systems    Vitals:    08/29/20 1943   BP: 129/68   Pulse: 65   Resp: 18   Temp: 99.1 °F (37.3 °C)   SpO2: 100%   Weight: 83.5 kg (184 lb)   Height: 5' 6\" (1.676 m)            Physical Exam  Vitals signs and nursing note reviewed. Constitutional:       General: She is not in acute distress. Appearance: She is well-developed. She is not diaphoretic. HENT:      Head: Normocephalic. Right Ear: External ear normal.      Left Ear: External ear normal.      Mouth/Throat:      Pharynx: No oropharyngeal exudate. Eyes:      General: No scleral icterus. Right eye: No discharge. Left eye: No discharge. Conjunctiva/sclera: Conjunctivae normal.      Pupils: Pupils are equal, round, and reactive to light. Neck:      Musculoskeletal: Normal range of motion and neck supple. Thyroid: No thyromegaly. Vascular: No JVD. Trachea: No tracheal deviation. Cardiovascular:      Rate and Rhythm: Normal rate and regular rhythm. Heart sounds: Normal heart sounds. No murmur. No friction rub. No gallop.     Pulmonary:      Effort: Pulmonary effort is normal. No respiratory distress. Breath sounds: Normal breath sounds. No stridor. No wheezing or rales. Chest:      Chest wall: No tenderness. Abdominal:      General: Bowel sounds are normal. There is no distension. Palpations: Abdomen is soft. There is no mass. Tenderness: There is no abdominal tenderness. There is no guarding or rebound. Musculoskeletal: Normal range of motion. General: No tenderness. Lymphadenopathy:      Cervical: No cervical adenopathy. Skin:     General: Skin is warm and dry. Coloration: Skin is not pale. Findings: No erythema or rash. Neurological:      Mental Status: She is alert and oriented to person, place, and time. Cranial Nerves: No cranial nerve deficit. Motor: No abnormal muscle tone. Coordination: Coordination normal.      Deep Tendon Reflexes: Reflexes normal.     RIGHT LOWER LEG: no edema, no discomfort, normal ROM, pulses and sensory. [facility]  ED Course as of Aug 29 2221   Sat Aug 29, 2020   2152 D DIMER(!): 0.78 [KB]      ED Course User Index  [KB] Tung Turcios MD       Procedures      Dx: elevated D dimer    Disp: D/C  Home. Return to radiology in am for right leg doppler study. Dictation disclaimer:  Please note that this dictation was completed with [employer], the computer voice recognition software. Quite often unanticipated grammatical, syntax, homophones, and other interpretive errors are inadvertently transcribed by the computer software. Please disregard these errors. Please excuse any errors that have escaped final proofreading.

## 2020-08-30 NOTE — ED PROVIDER NOTES
39-year-old female seen here last night with right lower extremity pain was advised to return today for Doppler ultrasound study. Patient's d-dimer is mildly elevated and she was empirically treated with Lovenox 80 mg subcu. Notes pain in the right lower extremity for the last several days without acute traumatic injury. Concern for DVT. No shortness of breath. Past Medical History:   Diagnosis Date    Anemia     IBS (irritable bowel syndrome)     Sickle cell trait (HCC)     Thyroid goiter     Ulnar nerve entrapment syndrome        No past surgical history on file. Family History:   Problem Relation Age of Onset    Hypertension Mother     No Known Problems Father     Cancer Maternal Grandmother         Ovarian Cancer    Cancer Paternal Aunt         breast 45s    Cancer Paternal Grandmother         breast 60s/mastectomy 6/15/15    Other Daughter         neutropenia, jujeno web       Social History     Socioeconomic History    Marital status:      Spouse name: Not on file    Number of children: Not on file    Years of education: Not on file    Highest education level: Not on file   Occupational History    Occupation: homemaker   Social Needs    Financial resource strain: Not on file    Food insecurity     Worry: Not on file     Inability: Not on file   [place] needs     Medical: Not on file     Non-medical: Not on file   Tobacco Use    Smoking status: Never Smoker    Smokeless tobacco: Never Used   Substance and Sexual Activity    Alcohol use:  Yes     Alcohol/week: 0.0 standard drinks     Comment: Social Drinker    Drug use: No    Sexual activity: Yes     Partners: Male     Birth control/protection: Patch     Comment: birth control patch   Lifestyle    Physical activity     Days per week: Not on file     Minutes per session: Not on file    Stress: Not on file   Relationships    Social connections     Talks on phone: Not on file     Gets together: Not on file Attends Spiritism service: Not on file     Active member of club or organization: Not on file     Attends meetings of clubs or organizations: Not on file     Relationship status: Not on file    Intimate partner violence     Fear of current or ex partner: Not on file     Emotionally abused: Not on file     Physically abused: Not on file     Forced sexual activity: Not on file   Other Topics Concern     Service Not Asked    Blood Transfusions Not Asked    Caffeine Concern Yes     Comment: 1 cup a day    Occupational Exposure Not Asked    Hobby Hazards Not Asked    Sleep Concern No    Stress Concern No    Weight Concern No    Special Diet Not Asked    Back Care Not Asked    Exercise No    Bike Helmet Not Asked    Seat Belt Yes    Self-Exams Yes   Social History Narrative    Not on file         ALLERGIES: Patient has no known allergies. Review of Systems   Constitutional: Negative for fever. Respiratory: Negative for shortness of breath. All other systems reviewed and are negative. There were no vitals filed for this visit. Physical Exam  Vitals signs and nursing note reviewed. Constitutional:       General: She is not in acute distress. Appearance: She is well-developed. HENT:      Head: Normocephalic and atraumatic. Eyes:      General: No scleral icterus. Cardiovascular:      Rate and Rhythm: Normal rate. Pulmonary:      Effort: Pulmonary effort is normal.   Musculoskeletal:      Comments: Tenderness along the lateral aspect of the right calf. No appreciable swelling. No midline posterior calf tenderness. Distal neurovascular preserved. Skin:     General: Skin is warm and dry. Neurological:      Mental Status: She is alert and oriented to person, place, and time. preliminary report:    · No evidence of acute deep vein thrombosis in the right common femoral, superficial femoral, popliteal, posterior tibial, and peroneal veins.  The veins were imaged in the transverse and longitudinal planes. The vessels showed normal color filling and compressibility. Doppler interrogation showed phasic and spontaneous flow. Lower Extremity Venous Findings     Right Lower Venous     No evidence of deep vein thrombosis in the common femoral, profunda femoral, femoral, popliteal, posterior tibial, and peroneal veins. The veins were imaged in the transverse and longitudinal planes. The vessels showed normal color filling and compressibility. Doppler interrogation showed phasic and spontaneous flow. The right posterior tibial artery has biphasic waveforms. Left Lower Venous     For comparison purposes, the left common femoral vein was briefly interrogated. These vein demonstrates normal color filing and compressibility. Doppler flow was phasic and spontaneous. MDM  Number of Diagnoses or Management Options  Diagnosis management comments: Impression: Right lower extremity pain here for ultrasound Doppler study of the right lower extremity to rule out DVT. Procedures      Diagnosis:   1. Arthralgia of right lower leg      1. Preliminary reading of Doppler ultrasound study is negative for DVT (deep vein thrombosis). 2.  Okay to take Motrin or Aleve over-the-counter for discomfort. 3.  Recommend follow-up ultrasound study in 2 to 3 weeks if pain or swelling persists. 4.  Follow-up with your primary care physician for recheck of ongoing symptoms in 7 to 10 days as needed. 5.  Return for severe pain, severe swelling, or acute shortness of breath.     Disposition: home    Follow-up Information     Follow up With Specialties Details Why Contact Richie Lua NP Nurse Practitioner In 1 week As needed, for recheck of ongoing symptoms [address]  [address] [place] [address]  [phone] [address] [facility] EMERGENCY DEPT Emergency Medicine  As needed, If symptoms worsen [address] [facility]  [phone] Patient's Medications   Start Taking    No medications on file   Continue Taking    DICYCLOMINE (BENTYL) 10 MG CAPSULE    Take 1 Cap by mouth three (3) times daily. Take as needed    MELOXICAM (MOBIC) 15 MG TABLET    Take 1 Tab by mouth daily. NORELGESTROMIN-ETHINYL ESTRADIOL (XULANE) 150-35 MCG/24 HR    1 Patch by TransDERmal route Every Saturday.    These Medications have changed    No medications on file   Stop Taking    No medications on file

## 2020-08-30 NOTE — ED NOTES
Pt sitting up in bed calmly; no distress noted. RN assessment complete; awaiting MD evaluation. Rails up x 2 with call light in reach.

## 2020-08-30 NOTE — DISCHARGE INSTRUCTIONS
1.  Preliminary reading of Doppler ultrasound study is negative for DVT (deep vein thrombosis). 2.  Okay to take Motrin or Aleve over-the-counter for discomfort. 3.  Recommend follow-up ultrasound study in 2 to 3 weeks if pain or swelling persists. 4.  Follow-up with your primary care physician for recheck of ongoing symptoms in 7 to 10 days as needed. 5.  Return for severe pain, severe swelling, or acute shortness of breath.

## 2020-08-30 NOTE — ED NOTES
Pt resting comfortably/awake in bed without distress. Plan of care has been explained/understood. Pt informed of of hazards of blood thinning medications, flossing/shaving/use of sharp objects/abdominal/chest/head trauma requiring immediate need for evaluation by ED staff. Pt informed off need for follow up in the AM; will call ED unable to contact vascular tech.

## 2020-08-30 NOTE — ED NOTES
Phone number/name/ provided to voice mail on for vascular tech. Pt instructed to notify staff for any worsening pain/swelling/chest pain/shortness of breath, and to call ED no later than 10 am if she does not receive a call from Vascular. Pt voiced her understanding.

## 2020-09-09 NOTE — PATIENT INSTRUCTIONS
Well Visit, Ages 25 to 48: Care Instructions Your Care Instructions Physical exams can help you stay healthy. Your doctor has checked your overall health and may have suggested ways to take good care of yourself. He or she also may have recommended tests. At home, you can help prevent illness with healthy eating, regular exercise, and other steps. Follow-up care is a key part of your treatment and safety. Be sure to make and go to all appointments, and call your doctor if you are having problems. It's also a good idea to know your test results and keep a list of the medicines you take. How can you care for yourself at home? · Reach and stay at a healthy weight. This will lower your risk for many problems, such as obesity, diabetes, heart disease, and high blood pressure. · Get at least 30 minutes of physical activity on most days of the week. Walking is a good choice. You also may want to do other activities, such as running, swimming, cycling, or playing tennis or team sports. Discuss any changes in your exercise program with your doctor. · Do not smoke or allow others to smoke around you. If you need help quitting, talk to your doctor about stop-smoking programs and medicines. These can increase your chances of quitting for good. · Talk to your doctor about whether you have any risk factors for sexually transmitted infections (STIs). Having one sex partner (who does not have STIs and does not have sex with anyone else) is a good way to avoid these infections. · Use birth control if you do not want to have children at this time. Talk with your doctor about the choices available and what might be best for you. · Protect your skin from too much sun. When you're outdoors from 10 a.m. to 4 p.m., stay in the shade or cover up with clothing and a hat with a wide brim. Wear sunglasses that block UV rays. Even when it's cloudy, put broad-spectrum sunscreen (SPF 30 or higher) on any exposed skin. · See a dentist one or two times a year for checkups and to have your teeth cleaned. · Wear a seat belt in the car. Follow your doctor's advice about when to have certain tests. These tests can spot problems early. For everyone · Cholesterol. Have the fat (cholesterol) in your blood tested after age 21. Your doctor will tell you how often to have this done based on your age, family history, or other things that can increase your risk for heart disease. · Blood pressure. Have your blood pressure checked during a routine doctor visit. Your doctor will tell you how often to check your blood pressure based on your age, your blood pressure results, and other factors. · Vision. Talk with your doctor about how often to have a glaucoma test. 
· Diabetes. Ask your doctor whether you should have tests for diabetes. · Colon cancer. Your risk for colorectal cancer gets higher as you get older. Some experts say that adults should start regular screening at age 48 and stop at age 76. Others say to start before age 48 or continue after age 76. Talk with your doctor about your risk and when to start and stop screening. For women · Breast exam and mammogram. Talk to your doctor about when you should have a clinical breast exam and a mammogram. Medical experts differ on whether and how often women under 50 should have these tests. Your doctor can help you decide what is right for you. · Cervical cancer screening test and pelvic exam. Begin with a Pap test at age 24. The test often is part of a pelvic exam. Starting at age 27, you may choose to have a Pap test, an HPV test, or both tests at the same time (called co-testing). Talk with your doctor about how often to have testing. · Tests for sexually transmitted infections (STIs). Ask whether you should have tests for STIs. You may be at risk if you have sex with more than one person, especially if your partners do not wear condoms. For men · Tests for sexually transmitted infections (STIs). Ask whether you should have tests for STIs. You may be at risk if you have sex with more than one person, especially if you do not wear a condom. · Testicular cancer exam. Ask your doctor whether you should check your testicles regularly. · Prostate exam. Talk to your doctor about whether you should have a blood test (called a PSA test) for prostate cancer. Experts differ on whether and when men should have this test. Some experts suggest it if you are older than 39 and are -American or have a father or brother who got prostate cancer when he was younger than 72. When should you call for help? Watch closely for changes in your health, and be sure to contact your doctor if you have any problems or symptoms that concern you. Where can you learn more? Go to http://www.good.com/ Enter P072 in the search box to learn more about \"Well Visit, Ages 25 to 48: Care Instructions. \" Current as of: August 22, 2019               Content Version: 12.5 © 7809-3770 Healthwise, Incorporated. Care instructions adapted under license by [employer] (which disclaims liability or warranty for this information). If you have questions about a medical condition or this instruction, always ask your healthcare professional. [address] any warranty or liability for your use of this information. normal (ped)...

## 2021-06-28 RX ORDER — DICYCLOMINE HYDROCHLORIDE 10 MG/1
10 CAPSULE ORAL 3 TIMES DAILY
Qty: 90 CAPSULE | Refills: 0 | Status: CANCELLED | OUTPATIENT
Start: 2021-06-28

## 2021-06-29 RX ORDER — DICYCLOMINE HYDROCHLORIDE 10 MG/1
10 CAPSULE ORAL 3 TIMES DAILY
Qty: 90 CAPSULE | Refills: 0 | Status: SHIPPED | OUTPATIENT
Start: 2021-06-29 | End: 2022-02-22 | Stop reason: SDUPTHER

## 2021-08-10 ENCOUNTER — OFFICE VISIT (OUTPATIENT)
Dept: FAMILY MEDICINE CLINIC | Age: 36
End: 2021-08-10
Payer: OTHER GOVERNMENT

## 2021-08-10 ENCOUNTER — DOCUMENTATION ONLY (OUTPATIENT)
Dept: FAMILY MEDICINE CLINIC | Age: 36
End: 2021-08-10

## 2021-08-10 VITALS
HEIGHT: 66 IN | WEIGHT: 172 LBS | DIASTOLIC BLOOD PRESSURE: 78 MMHG | OXYGEN SATURATION: 100 % | HEART RATE: 61 BPM | BODY MASS INDEX: 27.64 KG/M2 | RESPIRATION RATE: 18 BRPM | TEMPERATURE: 97.3 F | SYSTOLIC BLOOD PRESSURE: 118 MMHG

## 2021-08-10 DIAGNOSIS — Z13.83 SCREENING FOR CARDIOVASCULAR, RESPIRATORY, AND GENITOURINARY DISEASES: ICD-10-CM

## 2021-08-10 DIAGNOSIS — Z13.89 SCREENING FOR CARDIOVASCULAR, RESPIRATORY, AND GENITOURINARY DISEASES: ICD-10-CM

## 2021-08-10 DIAGNOSIS — R94.6 THYROID FUNCTION TEST ABNORMAL: ICD-10-CM

## 2021-08-10 DIAGNOSIS — K58.2 IRRITABLE BOWEL SYNDROME WITH BOTH CONSTIPATION AND DIARRHEA: ICD-10-CM

## 2021-08-10 DIAGNOSIS — Z00.00 WELL WOMAN EXAM (NO GYNECOLOGICAL EXAM): Primary | ICD-10-CM

## 2021-08-10 DIAGNOSIS — Z13.6 SCREENING FOR CARDIOVASCULAR, RESPIRATORY, AND GENITOURINARY DISEASES: ICD-10-CM

## 2021-08-10 PROCEDURE — 99395 PREV VISIT EST AGE 18-39: CPT | Performed by: NURSE PRACTITIONER

## 2021-08-10 RX ORDER — FLUCONAZOLE 150 MG/1
150 TABLET ORAL
Qty: 2 TABLET | Refills: 0 | Status: SHIPPED | OUTPATIENT
Start: 2021-08-10 | End: 2021-08-18

## 2021-08-10 RX ORDER — CEPHALEXIN 500 MG/1
500 CAPSULE ORAL 3 TIMES DAILY
Qty: 30 CAPSULE | Refills: 0 | Status: SHIPPED | OUTPATIENT
Start: 2021-08-10 | End: 2021-08-20

## 2021-08-10 NOTE — PATIENT INSTRUCTIONS
Irritable Bowel Syndrome: Care Instructions  Your Care Instructions  Irritable bowel syndrome, or IBS, is a problem with the intestines that causes belly pain, bloating, gas, constipation, and diarrhea. The cause of IBS is not well known. IBS can last for many years, but it does not get worse over time or lead to serious disease. Most people can control their symptoms by changing their diet and reducing stress. Follow-up care is a key part of your treatment and safety. Be sure to make and go to all appointments, and call your doctor if you are having problems. It's also a good idea to know your test results and keep a list of the medicines you take. How can you care for yourself at home? · Prevent diarrhea:  ? Limit the amount of high-fiber foods you eat. This includes vegetables, fruits, whole-grain breads and pasta, high-fiber cereal, and brown rice. ? Limit dairy products. ? Limit artificial sweeteners such as sorbitol and xylitol. · Avoid constipation:  ? Include fruits, vegetables, beans, and whole grains in your diet each day. These foods are high in fiber. ? Drink plenty of fluids. If you have kidney, heart, or liver disease and have to limit fluids, talk with your doctor before you increase the amount of fluids you drink. ? Get some exercise every day. Build up slowly to 30 to 60 minutes a day on 5 or more days of the week. ? Take a fiber supplement, such as Citrucel or Metamucil, every day if needed. Read and follow all instructions on the label. ? Schedule time each day for a bowel movement. Having a daily routine may help. Take your time and do not strain when having a bowel movement. · To help relieve bloating or gas, avoid foods such as beans, cabbage, cauliflower, or broccoli. · Keep a daily diary of what you eat and what symptoms you have. This may help find foods that cause you problems. · Eat slowly. Try to make mealtime relaxing. · Find ways to reduce stress.   When should you call for help? Call your doctor now or seek immediate medical care if:    · Your pain is different than usual or occurs with fever.     · You lose weight without trying, or you lose your appetite and you do not know why.     · Your symptoms often wake you from sleep.     · Your stools are black and tarlike or have streaks of blood. Watch closely for changes in your health, and be sure to contact your doctor if:    · Your IBS symptoms get worse or begin to disrupt your day-to-day life.     · You become more tired than usual.     · Your home treatment stops working. Where can you learn more? Go to http://www.gray.com/  Enter Y447 in the search box to learn more about \"Irritable Bowel Syndrome: Care Instructions. \"  Current as of: April 15, 2020               Content Version: 12.8  © 3954-9585 Healthwise, Incorporated. Care instructions adapted under license by [employer] (which disclaims liability or warranty for this information). If you have questions about a medical condition or this instruction, always ask your healthcare professional. [place] [address] any warranty or liability for your use of this information.

## 2021-08-10 NOTE — LETTER
NOTIFICATION RETURN TO WORK    8/10/2021 12:00 PM    Ms. Naina Murillo  [facility] [address] [phone]      To Whom It May Concern: Naina Murillo [facility] the [facility] She will return to work on: 8/10/2021    If there are questions or concerns please have the patient contact our office.         Sincerely,      Garrett Clark NP

## 2021-08-10 NOTE — PROGRESS NOTES
Subjective:   39 y.o. female for Well Woman Check. Her gyne and breast care is done elsewhere by her Ob-Gyne physician. Followed by Dr. Jerald Parker, last PAP 12/2020, normal per patient. Patient Active Problem List   Diagnosis Code    Thyroid goiter E04.9    IBS (irritable bowel syndrome) K58.9    Right-sided Bell's palsy G51.0    Advanced directives, counseling/discussion Z71.89     Patient Active Problem List    Diagnosis Date Noted    Advanced directives, counseling/discussion 05/23/2016    Right-sided Bell's palsy 04/24/2015    Thyroid goiter     IBS (irritable bowel syndrome)      Current Outpatient Medications   Medication Sig Dispense Refill    multivit-mins no.63/iron/folic (M-VIT PO) Take  by mouth.  dicyclomine (BentyL) 10 mg capsule Take 1 Capsule by mouth three (3) times daily. Take as needed 90 Capsule 0    meloxicam (MOBIC) 15 mg tablet Take 1 Tab by mouth daily. 30 Tab 0    norelgestromin-ethinyl estradiol Epi Sanchez) 150-35 mcg/24 hr 1 Patch by TransDERmal route Every Saturday. 3 Patch 11     No Known Allergies  Past Medical History:   Diagnosis Date    Anemia     IBS (irritable bowel syndrome)     Sickle cell trait (HCC)     Thyroid goiter     Ulnar nerve entrapment syndrome      History reviewed. No pertinent surgical history. Family History   Problem Relation Age of Onset    Hypertension Mother     No Known Problems Father     Cancer Maternal Grandmother         Ovarian Cancer    Cancer Paternal Aunt         breast 45s    Cancer Paternal Grandmother         breast 60s/mastectomy 6/15/15    Other Daughter         neutropenia, jujeno web     Social History     Tobacco Use    Smoking status: Never Smoker    Smokeless tobacco: Never Used   Substance Use Topics    Alcohol use:  Yes     Alcohol/week: 0.0 standard drinks     Comment: Social Drinker        Lab Results   Component Value Date/Time    WBC 6.9 08/29/2020 09:27 PM    HGB 13.1 08/29/2020 09:27 PM    HCT 38.9 08/29/2020 09:27 PM    PLATELET 323 28/03/5356 09:27 PM    MCV 78.0 08/29/2020 09:27 PM     Lab Results   Component Value Date/Time    Hemoglobin A1c 5.5 08/11/2020 02:18 PM    Hemoglobin A1c 5.2 07/15/2019 07:33 AM    Glucose 95 08/29/2020 09:27 PM    LDL, calculated 70.8 08/11/2020 02:18 PM    Creatinine 0.87 08/29/2020 09:27 PM      Lab Results   Component Value Date/Time    Cholesterol, total 145 08/11/2020 02:18 PM    HDL Cholesterol 60 08/11/2020 02:18 PM    LDL, calculated 70.8 08/11/2020 02:18 PM    Triglyceride 71 08/11/2020 02:18 PM    CHOL/HDL Ratio 2.4 08/11/2020 02:18 PM     Lab Results   Component Value Date/Time    Sodium 139 08/29/2020 09:27 PM    Potassium 4.3 08/29/2020 09:27 PM    Chloride 106 08/29/2020 09:27 PM    CO2 29 08/29/2020 09:27 PM    Anion gap 4 08/29/2020 09:27 PM    Glucose 95 08/29/2020 09:27 PM    BUN 12 08/29/2020 09:27 PM    Creatinine 0.87 08/29/2020 09:27 PM    BUN/Creatinine ratio 14 08/29/2020 09:27 PM    GFR est AA >60 08/29/2020 09:27 PM    GFR est non-AA >60 08/29/2020 09:27 PM    Calcium 8.9 08/29/2020 09:27 PM    Bilirubin, total 0.4 08/11/2020 02:18 PM    ALT (SGPT) 30 08/11/2020 02:18 PM    Alk. phosphatase 50 08/11/2020 02:18 PM    Protein, total 7.4 08/11/2020 02:18 PM    Albumin 3.5 08/11/2020 02:18 PM    Globulin 3.9 08/11/2020 02:18 PM    A-G Ratio 0.9 08/11/2020 02:18 PM      Lab Results   Component Value Date/Time    Hemoglobin A1c 5.5 08/11/2020 02:18 PM         ROS: Feeling generally well. No TIA's or unusual headaches, no dysphagia. No prolonged cough. No dyspnea or chest pain on exertion. No abdominal pain, change in bowel habits, black or bloody stools. No urinary tract symptoms. No new or unusual musculoskeletal symptoms. Specific concerns today: states she was bitten by an insect to left thigh Thursday. Comments she noticed Saturday with bathing the area had increased redness but reports this morning it doesn't look that way. Objective:    The patient appears well, alert, oriented x 3, in no distress. Visit Vitals  /78 (BP 1 Location: Right upper arm, BP Patient Position: Sitting, BP Cuff Size: Adult)   Pulse 61   Temp 97.3 °F (36.3 °C) (Temporal)   Resp 18   Ht 5' 6\" (1.676 m)   Wt 172 lb (78 kg)   LMP 08/06/2021   SpO2 100%   BMI 27.76 kg/m²     ENT normal.  Neck supple. No adenopathy or thyromegaly. DANIEL. Lungs are clear, good air entry, no wheezes, rhonchi or rales. S1 and S2 normal, no murmurs, regular rate and rhythm. Abdomen soft without tenderness, guarding, mass or organomegaly. Extremities show no edema, normal peripheral pulses. Neurological is normal, no focal findings. Integumentary: erythematous plaque to left upper anterior thigh. Breast and Pelvic exams are deferred. Assessment/Plan:   Well Woman  routine labs ordered, call if any problems    ICD-10-CM ICD-9-CM    1. Well woman exam (no gynecological exam)  Z00.00 V70.0     [V70.0]   2. Irritable bowel syndrome with both constipation and diarrhea  K58.2 564.1    3. Thyroid function test abnormal  R94.6 794.5 TSH 3RD GENERATION      T4, FREE   4. Screening for cardiovascular, respiratory, and genitourinary diseases  Z13.6 V81.2 HEMOGLOBIN A1C WITH EAG    Z13.89 V81.6 LIPID PANEL    W00.60 K09.0 METABOLIC PANEL, COMPREHENSIVE     Orders Placed This Encounter    TSH 3RD GENERATION    T4, FREE    HEMOGLOBIN A1C WITH EAG    LIPID PANEL    METABOLIC PANEL, COMPREHENSIVE    multivit-mins no.63/iron/folic (M-VIT PO)    cephALEXin (KEFLEX) 500 mg capsule    fluconazole (DIFLUCAN) 150 mg tablet     I have discussed the diagnosis with the patient and the intended plan as seen in the above orders. The patient has received an after-visit summary and questions were answered concerning future plans. I have discussed medication side effects and warnings with the patient as well. Patient agreeable with above plan and verbalizes understanding.     Follow-up and Dispositions · Return in about 1 year (around 8/10/2022) for WWE, fasting labs 1 wk prior, in office follow up.    Routing History

## 2021-09-07 ENCOUNTER — APPOINTMENT (OUTPATIENT)
Dept: FAMILY MEDICINE CLINIC | Age: 36
End: 2021-09-07

## 2021-09-07 ENCOUNTER — HOSPITAL ENCOUNTER (OUTPATIENT)
Dept: LAB | Age: 36
Discharge: HOME | End: 2021-09-07
Payer: OTHER GOVERNMENT

## 2021-09-07 DIAGNOSIS — Z13.89 SCREENING FOR CARDIOVASCULAR, RESPIRATORY, AND GENITOURINARY DISEASES: ICD-10-CM

## 2021-09-07 DIAGNOSIS — Z13.83 SCREENING FOR CARDIOVASCULAR, RESPIRATORY, AND GENITOURINARY DISEASES: ICD-10-CM

## 2021-09-07 DIAGNOSIS — R94.6 THYROID FUNCTION TEST ABNORMAL: ICD-10-CM

## 2021-09-07 DIAGNOSIS — Z13.6 SCREENING FOR CARDIOVASCULAR, RESPIRATORY, AND GENITOURINARY DISEASES: ICD-10-CM

## 2021-09-07 LAB
ALBUMIN SERPL-MCNC: 3.5 G/DL (ref 3.4–5)
ALBUMIN/GLOB SERPL: 0.9 {RATIO} (ref 0.8–1.7)
ALP SERPL-CCNC: 42 U/L (ref 45–117)
ALT SERPL-CCNC: 24 U/L (ref 13–56)
ANION GAP SERPL CALC-SCNC: 2 MMOL/L (ref 3–18)
AST SERPL-CCNC: 19 U/L (ref 10–38)
BILIRUB SERPL-MCNC: 0.4 MG/DL (ref 0.2–1)
BUN SERPL-MCNC: 15 MG/DL (ref 7–18)
BUN/CREAT SERPL: 18 (ref 12–20)
CALCIUM SERPL-MCNC: 8.8 MG/DL (ref 8.5–10.1)
CHLORIDE SERPL-SCNC: 105 MMOL/L (ref 100–111)
CHOLEST SERPL-MCNC: 164 MG/DL
CO2 SERPL-SCNC: 30 MMOL/L (ref 21–32)
CREAT SERPL-MCNC: 0.84 MG/DL (ref 0.6–1.3)
EST. AVERAGE GLUCOSE BLD GHB EST-MCNC: 105 MG/DL
GLOBULIN SER CALC-MCNC: 4 G/DL (ref 2–4)
GLUCOSE SERPL-MCNC: 85 MG/DL (ref 74–99)
HBA1C MFR BLD: 5.3 % (ref 4.2–5.6)
HDLC SERPL-MCNC: 73 MG/DL (ref 40–60)
HDLC SERPL: 2.2 {RATIO} (ref 0–5)
LDLC SERPL CALC-MCNC: 76 MG/DL (ref 0–100)
LIPID PROFILE,FLP: ABNORMAL
POTASSIUM SERPL-SCNC: 4.5 MMOL/L (ref 3.5–5.5)
PROT SERPL-MCNC: 7.5 G/DL (ref 6.4–8.2)
SODIUM SERPL-SCNC: 137 MMOL/L (ref 136–145)
T4 FREE SERPL-MCNC: 0.9 NG/DL (ref 0.7–1.5)
TRIGL SERPL-MCNC: 75 MG/DL (ref ?–150)
TSH SERPL DL<=0.05 MIU/L-ACNC: 0.59 UIU/ML (ref 0.36–3.74)
VLDLC SERPL CALC-MCNC: 15 MG/DL

## 2021-09-07 PROCEDURE — 84439 ASSAY OF FREE THYROXINE: CPT

## 2021-09-07 PROCEDURE — 83036 HEMOGLOBIN GLYCOSYLATED A1C: CPT

## 2021-09-07 PROCEDURE — 84443 ASSAY THYROID STIM HORMONE: CPT

## 2021-09-07 PROCEDURE — 36415 COLL VENOUS BLD VENIPUNCTURE: CPT

## 2021-09-07 PROCEDURE — 80061 LIPID PANEL: CPT

## 2021-09-07 PROCEDURE — 80053 COMPREHEN METABOLIC PANEL: CPT

## 2021-11-04 ENCOUNTER — OFFICE VISIT (OUTPATIENT)
Dept: FAMILY MEDICINE CLINIC | Age: 36
End: 2021-11-04
Payer: OTHER GOVERNMENT

## 2021-11-04 VITALS
BODY MASS INDEX: 28.28 KG/M2 | OXYGEN SATURATION: 98 % | TEMPERATURE: 98.4 F | WEIGHT: 176 LBS | RESPIRATION RATE: 20 BRPM | HEART RATE: 76 BPM | DIASTOLIC BLOOD PRESSURE: 60 MMHG | HEIGHT: 66 IN | SYSTOLIC BLOOD PRESSURE: 115 MMHG

## 2021-11-04 DIAGNOSIS — Z86.69 HISTORY OF BELL'S PALSY: Primary | ICD-10-CM

## 2021-11-04 DIAGNOSIS — G24.5 EYE TWITCH: ICD-10-CM

## 2021-11-04 DIAGNOSIS — G51.4 FACIAL TWITCHING: ICD-10-CM

## 2021-11-04 PROCEDURE — 99214 OFFICE O/P EST MOD 30 MIN: CPT | Performed by: NURSE PRACTITIONER

## 2021-11-04 RX ORDER — FAMOTIDINE 40 MG/1
40 TABLET, FILM COATED ORAL
Qty: 90 TABLET | Refills: 0 | Status: SHIPPED | OUTPATIENT
Start: 2021-11-04

## 2021-11-04 NOTE — PATIENT INSTRUCTIONS
Indigestion (Dyspepsia or Heartburn): Care Instructions  Your Care Instructions  Sometimes it can be hard to pinpoint the cause of indigestion. (It is also called dyspepsia or heartburn.) Most cases of an upset stomach with bloating, burning, burping, and nausea are minor and go away within several hours. Home treatment and over-the-counter medicine often are able to control symptoms. But if you take medicine to relieve your indigestion without making diet and lifestyle changes, your symptoms are likely to return again and again. If you get indigestion often, it may be a sign of a more serious medical problem. Be sure to follow up with your doctor, who may want to do tests to be sure of the cause of your indigestion. Follow-up care is a key part of your treatment and safety. Be sure to make and go to all appointments, and call your doctor if you are having problems. It's also a good idea to know your test results and keep a list of the medicines you take. How can you care for yourself at home? · Your doctor may recommend over-the-counter medicine. For mild or occasional indigestion, antacids such as Gaviscon, Mylanta, Maalox, or Tums, may help. Be safe with medicines. Be careful when you take over-the-counter antacid medicines. Many of these medicines have aspirin in them. Read the label to make sure that you are not taking more than the recommended dose. Too much aspirin can be harmful. · Your doctor also may recommend over-the-counter acid reducers, such as Pepcid AC (famotidine), Tagamet HB (cimetidine), or Prilosec (omeprazole). Read and follow all instructions on the label. If you use these medicines often, talk with your doctor. · Change your eating habits. ? It's best to eat several small meals instead of two or three large meals. ? After you eat, wait 2 to 3 hours before you lie down. ? Chocolate, mint, and alcohol can make GERD worse. ?  Spicy foods, foods that have a lot of acid (like tomatoes and oranges), and coffee can make GERD symptoms worse in some people. If your symptoms are worse after you eat a certain food, you may want to stop eating that food to see if your symptoms get better. · Do not smoke or chew tobacco. Smoking can make GERD worse. If you need help quitting, talk to your doctor about stop-smoking programs and medicines. These can increase your chances of quitting for good. · If you have GERD symptoms at night, raise the head of your bed 6 to 8 inches. You can do this by putting the frame on blocks or placing a foam wedge under the head of your mattress. (Adding extra pillows does not work.)  · Do not wear tight clothing around your middle. · Lose weight if you need to. Losing just 5 to 10 pounds can help. · Do not take anti-inflammatory medicines, such as aspirin, ibuprofen (Advil, Motrin), or naproxen (Aleve). These can irritate the stomach. If you need a pain medicine, try acetaminophen (Tylenol), which does not cause stomach upset. When should you call for help? Call your doctor now or seek immediate medical care if:    · You have new or worse belly pain.     · You are vomiting. Watch closely for changes in your health, and be sure to contact your doctor if:    · You have new or worse symptoms of indigestion.     · You have trouble or pain swallowing.     · You are losing weight.     · You do not get better as expected. Where can you learn more? Go to http://www.gray.com/  Enter [id number] in the search box to learn more about \"Indigestion (Dyspepsia or Heartburn): Care Instructions. \"  Current as of: February 10, 2021               Content Version: 13.0  © 2346-3602 Healthwise, Incorporated. Care instructions adapted under license by [employer] (which disclaims liability or warranty for this information).  If you have questions about a medical condition or this instruction, always ask your healthcare professional. [employer] disclaims any warranty or liability for your use of this information.

## 2021-11-04 NOTE — PROGRESS NOTES
Glen Tobar is a 39 y.o. female who was seen in clinic today (11/4/2021) for GERD and Other (States diagnosed with Bell's Palsy after lyme's disease. states she is still seeing some effects. )    Assessment & Plan:   Diagnoses and all orders for this visit:    1. History of Bell's palsy  -     REFERRAL TO NEUROLOGY    2. Facial twitching  -     REFERRAL TO NEUROLOGY    3. Eye twitch  -     REFERRAL TO NEUROLOGY    Other orders  -     famotidine (PEPCID) 40 mg tablet; Take 1 Tablet by mouth nightly. I have discussed the diagnosis with the patient and the intended plan as seen in the above orders. The patient has received an after-visit summary and questions were answered concerning future plans. I have discussed medication side effects and warnings with the patient as well. Patient agreeable with above plan and verbalizes understanding. Follow-up and Dispositions    · Return if symptoms worsen or fail to improve. Subjective:   Patient states she was taking an exam last month and noticed when she looked in the camera her face seemed to be drooping more on the right which was affected by bell's palsy several years ago. Further reports her left eye has been jumping. Patient admits to always being tired. Reports over the past several months she has been having increased gerd symptoms. States she will have water brash at times. Has decreased/avoids the foods that increase symptoms. Has not tried anything for the symptoms.     Lab Results   Component Value Date/Time    Sodium 137 09/07/2021 09:25 AM    Potassium 4.5 09/07/2021 09:25 AM    Chloride 105 09/07/2021 09:25 AM    CO2 30 09/07/2021 09:25 AM    Anion gap 2 (L) 09/07/2021 09:25 AM    Glucose 85 09/07/2021 09:25 AM    BUN 15 09/07/2021 09:25 AM    Creatinine 0.84 09/07/2021 09:25 AM    BUN/Creatinine ratio 18 09/07/2021 09:25 AM    GFR est AA >60 09/07/2021 09:25 AM    GFR est non-AA >60 09/07/2021 09:25 AM    Calcium 8.8 09/07/2021 09:25 AM    Bilirubin, total 0.4 09/07/2021 09:25 AM    Alk. phosphatase 42 (L) 09/07/2021 09:25 AM    Protein, total 7.5 09/07/2021 09:25 AM    Albumin 3.5 09/07/2021 09:25 AM    Globulin 4.0 09/07/2021 09:25 AM    A-G Ratio 0.9 09/07/2021 09:25 AM    ALT (SGPT) 24 09/07/2021 09:25 AM    AST (SGOT) 19 09/07/2021 09:25 AM     Lab Results   Component Value Date/Time    Cholesterol, total 164 09/07/2021 09:25 AM    HDL Cholesterol 73 (H) 09/07/2021 09:25 AM    LDL, calculated 76 09/07/2021 09:25 AM    VLDL, calculated 15 09/07/2021 09:25 AM    Triglyceride 75 09/07/2021 09:25 AM    CHOL/HDL Ratio 2.2 09/07/2021 09:25 AM     Lab Results   Component Value Date/Time    Hemoglobin A1c 5.3 09/07/2021 09:25 AM     No results found for: Bala Pollock, [id number]    Lab Results   Component Value Date/Time    WBC 6.9 08/29/2020 09:27 PM    HGB 13.1 08/29/2020 09:27 PM    HCT 38.9 08/29/2020 09:27 PM    PLATELET 728 56/66/0735 09:27 PM    MCV 78.0 08/29/2020 09:27 PM       Wt Readings from Last 3 Encounters:   11/04/21 176 lb (79.8 kg)   08/10/21 172 lb (78 kg)   08/30/20 181 lb (82.1 kg)     Temp Readings from Last 3 Encounters:   11/04/21 98.4 °F (36.9 °C) (Temporal)   08/10/21 97.3 °F (36.3 °C) (Temporal)   08/30/20 98.2 °F (36.8 °C)     BP Readings from Last 3 Encounters:   11/04/21 115/60   08/10/21 118/78   08/30/20 103/61     Pulse Readings from Last 3 Encounters:   11/04/21 76   08/10/21 61   08/30/20 69       Prior to Admission medications    Medication Sig Start Date End Date Taking? Authorizing Provider   multivit-mins no.63/iron/folic (M-VIT PO) Take  by mouth. Yes Provider, Historical   dicyclomine (BentyL) 10 mg capsule Take 1 Capsule by mouth three (3) times daily. Take as needed 6/29/21  Yes Delwin Fothergill T, NP   meloxicam (MOBIC) 15 mg tablet Take 1 Tab by mouth daily.  4/21/20  Yes James Ag, PANKAJ   norelgestromin-ethinyl estradiol [facility]) 150-35 mcg/24 hr 1 Patch by TransDERmal route Every Saturday. 3/18/17  Yes Dania Cheng DO         The following sections were reviewed & updated as appropriate: PMH, PSH, FH, and SH. Review of Systems   Constitutional: Negative for activity change, appetite change, chills, fatigue and fever. Respiratory: Negative for chest tightness and shortness of breath. Cardiovascular: Negative for chest pain. Gastrointestinal:        GERD   Neurological: Negative for dizziness and headaches. Objective:     Visit Vitals  /60 (BP 1 Location: Left arm, BP Patient Position: Sitting, BP Cuff Size: Adult)   Pulse 76   Temp 98.4 °F (36.9 °C) (Temporal)   Resp 20   Ht 5' 6\" (1.676 m)   Wt 176 lb (79.8 kg)   LMP 10/29/2021   SpO2 98%   BMI 28.41 kg/m²      Physical Exam  Constitutional:       General: She is not in acute distress. Appearance: She is well-developed. HENT:      Head: Normocephalic and atraumatic. Neck:      Vascular: No carotid bruit. Cardiovascular:      Rate and Rhythm: Normal rate and regular rhythm. Heart sounds: Normal heart sounds. No murmur heard. No friction rub. No gallop. Pulmonary:      Effort: Pulmonary effort is normal.      Breath sounds: Normal breath sounds. No decreased breath sounds, wheezing, rhonchi or rales. Abdominal:      General: Bowel sounds are normal.      Palpations: Abdomen is soft. Tenderness: There is abdominal tenderness (mild) in the epigastric area. Musculoskeletal:      Cervical back: Normal range of motion and neck supple. Lymphadenopathy:      Cervical: No cervical adenopathy. Skin:     General: Skin is warm and dry. Neurological:      Mental Status: She is alert and oriented to person, place, and time. Cranial Nerves: Cranial nerves are intact. Disclaimer: The patient understands our medical plan. Alternatives have been explained and offered. The risks, benefits and significant side effects of all medications have been reviewed.  Anticipated time course and progression of condition reviewed. All questions have been addressed. She is encouraged to employ the information provided in the after visit summary, which was reviewed. Where applicable, she is instructed to call the clinic if she has not been notified either by phone or through [address] with the results of her tests or with an appointment plan for any referrals within 1 week(s). No news is not good news; it's no news. The patient  is to call if her condition worsens or fails to improve or if significant side effects are experienced. Aspects of this note may have been generated using voice recognition software. Despite editing, there may be unrecognized errors.        Chucho Corrigan, NP

## 2022-02-22 NOTE — TELEPHONE ENCOUNTER
Last Visit: 11/4/21 with PANKAJ Gleason  Next Appointment: 8/19/22 with PANKAJ Gleason  Previous Refill Encounter(s): 6/29/21 #90    Requested Prescriptions     Pending Prescriptions Disp Refills    dicyclomine (BentyL) 10 mg capsule 90 Capsule 0     Sig: Take 1 Capsule by mouth three (3) times daily.  Take as needed

## 2022-02-23 RX ORDER — DICYCLOMINE HYDROCHLORIDE 10 MG/1
10 CAPSULE ORAL 3 TIMES DAILY
Qty: 90 CAPSULE | Refills: 0 | Status: SHIPPED | OUTPATIENT
Start: 2022-02-23 | End: 2022-07-15 | Stop reason: SDUPTHER

## 2022-07-15 NOTE — TELEPHONE ENCOUNTER
Last Visit: 11/4/21 with PANKAJ Malone  Next Appointment: 8/19/22 with PANKAJ Malone  Previous Refill Encounter(s): 2/23/2 #90    Requested Prescriptions     Pending Prescriptions Disp Refills    dicyclomine (BentyL) 10 mg capsule 90 Capsule 0     Sig: Take 1 Capsule by mouth three (3) times daily.  Take as needed         For Pharmacy [facility] in place:    Recommendation Provided To:    Intervention Detail: New Rx: 1, reason: Patient Preference   Gap Closed?:    Intervention Accepted By:   Leandra Shore Time Spent (min): 5

## 2022-07-18 RX ORDER — DICYCLOMINE HYDROCHLORIDE 10 MG/1
10 CAPSULE ORAL 3 TIMES DAILY
Qty: 90 CAPSULE | Refills: 0 | Status: SHIPPED | OUTPATIENT
Start: 2022-07-18

## 2022-08-01 ENCOUNTER — LAB ONLY (OUTPATIENT)
Dept: FAMILY MEDICINE CLINIC | Age: 37
End: 2022-08-01

## 2022-08-01 ENCOUNTER — HOSPITAL ENCOUNTER (OUTPATIENT)
Dept: LAB | Age: 37
Discharge: HOME | End: 2022-08-01
Payer: OTHER GOVERNMENT

## 2022-08-01 DIAGNOSIS — Z13.89 SCREENING FOR CARDIOVASCULAR, RESPIRATORY, AND GENITOURINARY DISEASES: ICD-10-CM

## 2022-08-01 DIAGNOSIS — Z13.6 SCREENING FOR CARDIOVASCULAR, RESPIRATORY, AND GENITOURINARY DISEASES: ICD-10-CM

## 2022-08-01 DIAGNOSIS — Z13.83 SCREENING FOR CARDIOVASCULAR, RESPIRATORY, AND GENITOURINARY DISEASES: ICD-10-CM

## 2022-08-01 DIAGNOSIS — R94.6 THYROID FUNCTION TEST ABNORMAL: Primary | ICD-10-CM

## 2022-08-01 DIAGNOSIS — R94.6 THYROID FUNCTION TEST ABNORMAL: ICD-10-CM

## 2022-08-01 LAB
ALBUMIN SERPL-MCNC: 3.5 G/DL (ref 3.4–5)
ALBUMIN/GLOB SERPL: 0.9 {RATIO} (ref 0.8–1.7)
ALP SERPL-CCNC: 37 U/L (ref 45–117)
ALT SERPL-CCNC: 16 U/L (ref 13–56)
ANION GAP SERPL CALC-SCNC: 7 MMOL/L (ref 3–18)
AST SERPL-CCNC: 13 U/L (ref 10–38)
BILIRUB SERPL-MCNC: 0.3 MG/DL (ref 0.2–1)
BUN SERPL-MCNC: 11 MG/DL (ref 7–18)
BUN/CREAT SERPL: 12 (ref 12–20)
CALCIUM SERPL-MCNC: 8.8 MG/DL (ref 8.5–10.1)
CHLORIDE SERPL-SCNC: 107 MMOL/L (ref 100–111)
CHOLEST SERPL-MCNC: 162 MG/DL
CO2 SERPL-SCNC: 27 MMOL/L (ref 21–32)
CREAT SERPL-MCNC: 0.91 MG/DL (ref 0.6–1.3)
EST. AVERAGE GLUCOSE BLD GHB EST-MCNC: 105 MG/DL
GLOBULIN SER CALC-MCNC: 3.7 G/DL (ref 2–4)
GLUCOSE SERPL-MCNC: 90 MG/DL (ref 74–99)
HBA1C MFR BLD: 5.3 % (ref 4.2–5.6)
HDLC SERPL-MCNC: 66 MG/DL (ref 40–60)
HDLC SERPL: 2.5 {RATIO} (ref 0–5)
LDLC SERPL CALC-MCNC: 69.4 MG/DL (ref 0–100)
LIPID PROFILE,FLP: ABNORMAL
POTASSIUM SERPL-SCNC: 4 MMOL/L (ref 3.5–5.5)
PROT SERPL-MCNC: 7.2 G/DL (ref 6.4–8.2)
SODIUM SERPL-SCNC: 141 MMOL/L (ref 136–145)
T4 FREE SERPL-MCNC: 0.9 NG/DL (ref 0.7–1.5)
TRIGL SERPL-MCNC: 133 MG/DL (ref ?–150)
TSH SERPL DL<=0.05 MIU/L-ACNC: 1.54 UIU/ML (ref 0.36–3.74)
VLDLC SERPL CALC-MCNC: 26.6 MG/DL

## 2022-08-01 PROCEDURE — 83036 HEMOGLOBIN GLYCOSYLATED A1C: CPT

## 2022-08-01 PROCEDURE — 84439 ASSAY OF FREE THYROXINE: CPT

## 2022-08-01 PROCEDURE — 36415 COLL VENOUS BLD VENIPUNCTURE: CPT

## 2022-08-01 PROCEDURE — 84443 ASSAY THYROID STIM HORMONE: CPT

## 2022-08-01 PROCEDURE — 80061 LIPID PANEL: CPT

## 2022-08-01 PROCEDURE — 80053 COMPREHEN METABOLIC PANEL: CPT

## 2022-08-19 ENCOUNTER — OFFICE VISIT (OUTPATIENT)
Dept: FAMILY MEDICINE CLINIC | Age: 37
End: 2022-08-19
Payer: OTHER GOVERNMENT

## 2022-08-19 VITALS
BODY MASS INDEX: 28.57 KG/M2 | DIASTOLIC BLOOD PRESSURE: 51 MMHG | HEIGHT: 66 IN | OXYGEN SATURATION: 99 % | RESPIRATION RATE: 16 BRPM | WEIGHT: 177.8 LBS | HEART RATE: 65 BPM | SYSTOLIC BLOOD PRESSURE: 110 MMHG | TEMPERATURE: 97 F

## 2022-08-19 DIAGNOSIS — Z00.00 WELL WOMAN EXAM (NO GYNECOLOGICAL EXAM): Primary | ICD-10-CM

## 2022-08-19 DIAGNOSIS — L30.9 DERMATITIS: ICD-10-CM

## 2022-08-19 PROCEDURE — 99395 PREV VISIT EST AGE 18-39: CPT | Performed by: NURSE PRACTITIONER

## 2022-08-19 RX ORDER — BETAMETHASONE DIPROPIONATE 0.5 MG/G
CREAM TOPICAL 2 TIMES DAILY
Qty: 45 G | Refills: 0 | Status: SHIPPED | OUTPATIENT
Start: 2022-08-19 | End: 2022-09-02

## 2022-08-19 RX ORDER — FLUCONAZOLE 150 MG/1
150 TABLET ORAL
Qty: 2 TABLET | Refills: 0 | Status: SHIPPED | OUTPATIENT
Start: 2022-08-19 | End: 2022-08-27

## 2022-08-19 RX ORDER — CEPHALEXIN 500 MG/1
500 CAPSULE ORAL 4 TIMES DAILY
Qty: 40 CAPSULE | Refills: 0 | Status: SHIPPED | OUTPATIENT
Start: 2022-08-19 | End: 2022-08-29

## 2022-08-19 RX ORDER — MELOXICAM 15 MG/1
15 TABLET ORAL DAILY
Qty: 30 TABLET | Refills: 0 | Status: SHIPPED | OUTPATIENT
Start: 2022-08-19

## 2022-08-19 NOTE — PROGRESS NOTES
Subjective:   44 y.o. female for Well Woman Check. Her gyne and breast care is done elsewhere by her Ob-Gyne physician. Patient followed by Dr. Rebekah Dick, last PAP 2/2022, normal per patient    Patient Active Problem List   Diagnosis Code    Thyroid goiter E04.9    IBS (irritable bowel syndrome) K58.9    Right-sided Bell's palsy G51.0    Advanced directives, counseling/discussion Z71.89     Patient Active Problem List    Diagnosis Date Noted    Advanced directives, counseling/discussion 05/23/2016    Right-sided Bell's palsy 04/24/2015    Thyroid goiter     IBS (irritable bowel syndrome)      Current Outpatient Medications   Medication Sig Dispense Refill    dicyclomine (BentyL) 10 mg capsule Take 1 Capsule by mouth three (3) times daily. Take as needed 90 Capsule 0    famotidine (PEPCID) 40 mg tablet Take 1 Tablet by mouth nightly. 90 Tablet 0    multivit-mins no.63/iron/folic (M-VIT PO) Take  by mouth.      meloxicam (MOBIC) 15 mg tablet Take 1 Tab by mouth daily. 30 Tab 0    norelgestromin-ethinyl estradiol [facility]) 150-35 mcg/24 hr 1 Patch by TransDERmal route Every Saturday. 3 Patch 11     No Known Allergies  Past Medical History:   Diagnosis Date    Anemia     IBS (irritable bowel syndrome)     Sickle cell trait (HCC)     Thyroid goiter     Ulnar nerve entrapment syndrome      No past surgical history on file. Family History   Problem Relation Age of Onset    Hypertension Mother     No Known Problems Father     Cancer Maternal Grandmother         Ovarian Cancer    Cancer Paternal Aunt         breast 45s    Cancer Paternal Grandmother         breast 60s/mastectomy 6/15/15    Other Daughter         neutropenia, jujeno web     Social History     Tobacco Use    Smoking status: Never    Smokeless tobacco: Never   Substance Use Topics    Alcohol use:  Yes     Alcohol/week: 0.0 standard drinks     Comment: Social Drinker        Lab Results   Component Value Date/Time    WBC 6.9 08/29/2020 09:27 PM    HGB 13.1 08/29/2020 09:27 PM    HCT 38.9 08/29/2020 09:27 PM    PLATELET 364 75/88/7164 09:27 PM    MCV 78.0 08/29/2020 09:27 PM     Lab Results   Component Value Date/Time    Cholesterol, total 162 08/01/2022 08:41 AM    HDL Cholesterol 66 (H) 08/01/2022 08:41 AM    LDL, calculated 69.4 08/01/2022 08:41 AM    Triglyceride 133 08/01/2022 08:41 AM    CHOL/HDL Ratio 2.5 08/01/2022 08:41 AM     Lab Results   Component Value Date/Time    TSH 1.54 08/01/2022 08:41 AM    T4, Free 0.9 08/01/2022 08:41 AM      Lab Results   Component Value Date/Time    Sodium 141 08/01/2022 08:41 AM    Potassium 4.0 08/01/2022 08:41 AM    Chloride 107 08/01/2022 08:41 AM    CO2 27 08/01/2022 08:41 AM    Anion gap 7 08/01/2022 08:41 AM    Glucose 90 08/01/2022 08:41 AM    BUN 11 08/01/2022 08:41 AM    Creatinine 0.91 08/01/2022 08:41 AM    BUN/Creatinine ratio 12 08/01/2022 08:41 AM    GFR est AA >60 08/01/2022 08:41 AM    GFR est non-AA >60 08/01/2022 08:41 AM    Calcium 8.8 08/01/2022 08:41 AM    Bilirubin, total 0.3 08/01/2022 08:41 AM    ALT (SGPT) 16 08/01/2022 08:41 AM    Alk. phosphatase 37 (L) 08/01/2022 08:41 AM    Protein, total 7.2 08/01/2022 08:41 AM    Albumin 3.5 08/01/2022 08:41 AM    Globulin 3.7 08/01/2022 08:41 AM    A-G Ratio 0.9 08/01/2022 08:41 AM      Lab Results   Component Value Date/Time    Hemoglobin A1c 5.3 08/01/2022 08:41 AM       ROS: Feeling generally well. No TIA's or unusual headaches, no dysphagia. No prolonged cough. No dyspnea or chest pain on exertion. No abdominal pain, change in bowel habits, black or bloody stools. No urinary tract symptoms. No new or unusual musculoskeletal symptoms. Specific concerns today: patient states she has a spot on her right thigh that has been present for the last year. States area itches only when she is sweaty or it's hot outside. Patient has hydrocortisone and previously prescribed betamethasone cream-with mild improvement. Denies increase in size.   Unsure if area my be an eczema spot. Objective: The patient appears well, alert, oriented x 3, in no distress. Visit Vitals  BP (!) 110/51 (BP 1 Location: Left upper arm, BP Patient Position: Sitting, BP Cuff Size: Small adult)   Pulse 65   Temp 97 °F (36.1 °C) (Temporal)   Resp 16   Ht 5' 6\" (1.676 m)   Wt 177 lb 12.8 oz (80.6 kg)   SpO2 99%   BMI 28.70 kg/m²     ENT normal.  Neck supple. No adenopathy or thyromegaly. DANIEL. Lungs are clear, good air entry, no wheezes, rhonchi or rales. S1 and S2 normal, no murmurs, regular rate and rhythm. Abdomen soft without tenderness, guarding, mass or organomegaly. Extremities show no edema, normal peripheral pulses. Neurological is normal, no focal findings. Skin - DERMATITIS NOTED: 2 flesh colored plaques to right anterior thigh. Breast and Pelvic exams are deferred. Assessment/Plan:   Well Woman  continue present diet with no restrictions, continue present plan, call if any problems    ICD-10-CM ICD-9-CM    1. Well woman exam (no gynecological exam)  Z00.00 V70.0     [V70.0]      2. Dermatitis  L30.9 692.9 REFERRAL TO DERMATOLOGY        Orders Placed This Encounter    REFERRAL TO DERMATOLOGY    cephALEXin (KEFLEX) 500 mg capsule    meloxicam (MOBIC) 15 mg tablet    fluconazole (DIFLUCAN) 150 mg tablet    betamethasone dipropionate (DIPROSONE) 0.05 % topical cream      Requested medication refills sent to pharmacy  I have discussed the diagnosis with the patient and the intended plan as seen in the above orders. The patient has received an after-visit summary and questions were answered concerning future plans. I have discussed medication side effects and warnings with the patient as well. Patient agreeable with above plan and verbalizes understanding. Follow-up and Dispositions    Return in about 1 year (around 8/19/2023) for WWE, fasting labs 1 wk prior, in office follow up.

## 2022-08-19 NOTE — PROGRESS NOTES
1. \"Have you been to the ER, urgent care clinic since your last visit? Hospitalized since your last visit? \" No    2. \"Have you seen or consulted any other health care providers outside of the [address] [place] [address] since your last visit? \" No     3. For patients aged 39-70: Has the patient had a colonoscopy / FIT/ Cologuard? NA - based on age      If the patient is female:    4. For patients aged 41-77: Has the patient had a mammogram within the past 2 years? NA - based on age or sex      11. For patients aged 21-65: Has the patient had a pap smear?  No

## 2022-09-15 RX ORDER — SULFAMETHOXAZOLE AND TRIMETHOPRIM 800; 160 MG/1; MG/1
1 TABLET ORAL 2 TIMES DAILY
Qty: 20 TABLET | Refills: 0 | Status: SHIPPED | OUTPATIENT
Start: 2022-09-15 | End: 2022-09-25

## 2022-10-28 ENCOUNTER — HOSPITAL ENCOUNTER (OUTPATIENT)
Dept: LAB | Age: 37
Discharge: HOME | End: 2022-10-28
Payer: OTHER GOVERNMENT

## 2022-10-28 DIAGNOSIS — K14.6 TONGUE PAIN: ICD-10-CM

## 2022-10-28 DIAGNOSIS — K14.6 TONGUE PAIN: Primary | ICD-10-CM

## 2022-10-28 LAB
BASOPHILS # BLD: 0 K/UL (ref 0–0.1)
BASOPHILS NFR BLD: 1 % (ref 0–2)
DIFFERENTIAL METHOD BLD: ABNORMAL
EOSINOPHIL # BLD: 0.1 K/UL (ref 0–0.4)
EOSINOPHIL NFR BLD: 2 % (ref 0–5)
ERYTHROCYTE [DISTWIDTH] IN BLOOD BY AUTOMATED COUNT: 14.1 % (ref 11.6–14.5)
FERRITIN SERPL-MCNC: 10 NG/ML (ref 8–388)
FOLATE SERPL-MCNC: 12 NG/ML (ref 3.1–17.5)
HCT VFR BLD AUTO: 36.7 % (ref 35–45)
HGB BLD-MCNC: 12.3 G/DL (ref 12–16)
IMM GRANULOCYTES # BLD AUTO: 0 K/UL (ref 0–0.04)
IMM GRANULOCYTES NFR BLD AUTO: 0 % (ref 0–0.5)
IRON SATN MFR SERPL: 24 % (ref 20–50)
IRON SERPL-MCNC: 100 UG/DL (ref 50–175)
LYMPHOCYTES # BLD: 2 K/UL (ref 0.9–3.6)
LYMPHOCYTES NFR BLD: 37 % (ref 21–52)
MCH RBC QN AUTO: 26.6 PG (ref 24–34)
MCHC RBC AUTO-ENTMCNC: 33.5 G/DL (ref 31–37)
MCV RBC AUTO: 79.4 FL (ref 78–100)
MONOCYTES # BLD: 0.5 K/UL (ref 0.05–1.2)
MONOCYTES NFR BLD: 9 % (ref 3–10)
NEUTS SEG # BLD: 2.8 K/UL (ref 1.8–8)
NEUTS SEG NFR BLD: 52 % (ref 40–73)
NRBC # BLD: 0 K/UL (ref 0–0.01)
NRBC BLD-RTO: 0 PER 100 WBC
PLATELET # BLD AUTO: 218 K/UL (ref 135–420)
PMV BLD AUTO: 11.9 FL (ref 9.2–11.8)
RBC # BLD AUTO: 4.62 M/UL (ref 4.2–5.3)
TIBC SERPL-MCNC: 422 UG/DL (ref 250–450)
VIT B12 SERPL-MCNC: 374 PG/ML (ref 211–911)
WBC # BLD AUTO: 5.4 K/UL (ref 4.6–13.2)

## 2022-10-28 PROCEDURE — 36415 COLL VENOUS BLD VENIPUNCTURE: CPT

## 2022-10-28 PROCEDURE — 82330 ASSAY OF CALCIUM: CPT

## 2022-10-28 PROCEDURE — 83540 ASSAY OF IRON: CPT

## 2022-10-28 PROCEDURE — 82607 VITAMIN B-12: CPT

## 2022-10-28 PROCEDURE — 85025 COMPLETE CBC W/AUTO DIFF WBC: CPT

## 2022-10-28 PROCEDURE — 82728 ASSAY OF FERRITIN: CPT

## 2022-10-30 LAB — CA-I SERPL ISE-MCNC: 5 MG/DL (ref 4.5–5.6)

## 2022-11-29 RX ORDER — FAMOTIDINE 40 MG/1
40 TABLET, FILM COATED ORAL
Qty: 90 TABLET | Refills: 3 | Status: SHIPPED | OUTPATIENT
Start: 2022-11-29

## 2022-11-29 RX ORDER — DICYCLOMINE HYDROCHLORIDE 10 MG/1
10 CAPSULE ORAL 3 TIMES DAILY
Qty: 90 CAPSULE | Refills: 3 | Status: SHIPPED | OUTPATIENT
Start: 2022-11-29

## 2022-12-10 ENCOUNTER — E-VISIT (OUTPATIENT)
Dept: FAMILY MEDICINE CLINIC | Age: 37
End: 2022-12-10
Payer: OTHER GOVERNMENT

## 2022-12-10 DIAGNOSIS — J01.90 ACUTE SINUSITIS, RECURRENCE NOT SPECIFIED, UNSPECIFIED LOCATION: Primary | ICD-10-CM

## 2022-12-10 RX ORDER — DOXYCYCLINE 100 MG/1
100 CAPSULE ORAL 2 TIMES DAILY
Qty: 20 CAPSULE | Refills: 0 | Status: SHIPPED | OUTPATIENT
Start: 2022-12-10 | End: 2022-12-20

## 2022-12-10 RX ORDER — FLUCONAZOLE 150 MG/1
150 TABLET ORAL
Qty: 2 TABLET | Refills: 0 | Status: SHIPPED | OUTPATIENT
Start: 2022-12-10 | End: 2022-12-18

## 2022-12-10 NOTE — PROGRESS NOTES
Dali Calderon (1985) initiated an asynchronous digital communication through [employer]. HPI: per patient questionnaire     Exam: not applicable    Diagnoses and all orders for this visit:  Diagnoses and all orders for this visit:    1. Acute sinusitis, recurrence not specified, unspecified location    Other orders  -     doxycycline (VIBRAMYCIN) 100 mg capsule; Take 1 Capsule by mouth two (2) times a day for 10 days. -     fluconazole (DIFLUCAN) 150 mg tablet; Take 1 Tablet by mouth every seven (7) days for 2 doses. Time: EV2 - 11-20 minutes were spent on the digital evaluation and management of this patient.        Vi Doherty NP

## 2023-02-22 RX ORDER — KETOCONAZOLE 20 MG/G
CREAM TOPICAL
Qty: 30 G | Refills: 5 | Status: SHIPPED | OUTPATIENT
Start: 2023-02-22

## 2023-02-22 RX ORDER — TRIAMCINOLONE ACETONIDE 1 MG/G
CREAM TOPICAL
Qty: 30 G | Refills: 5 | Status: SHIPPED | OUTPATIENT
Start: 2023-02-22

## 2023-04-06 RX ORDER — FLUCONAZOLE 150 MG/1
150 TABLET ORAL
Qty: 3 TABLET | Refills: 0 | Status: SHIPPED | OUTPATIENT
Start: 2023-04-06 | End: 2023-04-13

## 2023-04-06 RX ORDER — DICYCLOMINE HCL 20 MG
20 TABLET ORAL 3 TIMES DAILY PRN
Qty: 30 TABLET | Refills: 1 | Status: SHIPPED | OUTPATIENT
Start: 2023-04-06

## 2023-05-04 RX ORDER — DICYCLOMINE HCL 20 MG
20 TABLET ORAL 3 TIMES DAILY PRN
Qty: 30 TABLET | Refills: 1 | Status: CANCELLED | OUTPATIENT
Start: 2023-05-04

## 2023-05-15 DIAGNOSIS — R05.1 ACUTE COUGH: Primary | ICD-10-CM

## 2023-05-15 RX ORDER — BENZONATATE 200 MG/1
200 CAPSULE ORAL 3 TIMES DAILY PRN
Qty: 30 CAPSULE | Refills: 0 | Status: SHIPPED | OUTPATIENT
Start: 2023-05-15 | End: 2023-05-25

## 2023-05-15 RX ORDER — HYDROCODONE POLISTIREX AND CHLORPHENIRAMINE POLISTIREX 10; 8 MG/5ML; MG/5ML
5 SUSPENSION, EXTENDED RELEASE ORAL EVERY 12 HOURS PRN
Qty: 50 ML | Refills: 0 | Status: SHIPPED | OUTPATIENT
Start: 2023-05-15 | End: 2023-05-20

## 2023-08-08 DIAGNOSIS — Z13.21 ENCOUNTER FOR VITAMIN DEFICIENCY SCREENING: ICD-10-CM

## 2023-08-08 DIAGNOSIS — Z13.6 SCREENING FOR CARDIOVASCULAR, RESPIRATORY, AND GENITOURINARY DISEASES: Primary | ICD-10-CM

## 2023-08-08 DIAGNOSIS — Z13.89 SCREENING FOR CARDIOVASCULAR, RESPIRATORY, AND GENITOURINARY DISEASES: Primary | ICD-10-CM

## 2023-08-08 DIAGNOSIS — Z13.83 SCREENING FOR CARDIOVASCULAR, RESPIRATORY, AND GENITOURINARY DISEASES: Primary | ICD-10-CM

## 2023-08-08 DIAGNOSIS — E04.9 THYROID GOITER: ICD-10-CM

## 2023-08-09 ENCOUNTER — HOSPITAL ENCOUNTER (OUTPATIENT)
Facility: HOSPITAL | Age: 38
Setting detail: SPECIMEN
Discharge: HOME | End: 2023-08-12
Payer: OTHER GOVERNMENT

## 2023-08-09 DIAGNOSIS — Z13.89 SCREENING FOR CARDIOVASCULAR, RESPIRATORY, AND GENITOURINARY DISEASES: ICD-10-CM

## 2023-08-09 DIAGNOSIS — Z13.83 SCREENING FOR CARDIOVASCULAR, RESPIRATORY, AND GENITOURINARY DISEASES: ICD-10-CM

## 2023-08-09 DIAGNOSIS — Z13.21 ENCOUNTER FOR VITAMIN DEFICIENCY SCREENING: ICD-10-CM

## 2023-08-09 DIAGNOSIS — Z13.6 SCREENING FOR CARDIOVASCULAR, RESPIRATORY, AND GENITOURINARY DISEASES: ICD-10-CM

## 2023-08-09 DIAGNOSIS — E04.9 THYROID GOITER: ICD-10-CM

## 2023-08-09 LAB
25(OH)D3 SERPL-MCNC: 11.4 NG/ML (ref 30–100)
ALBUMIN SERPL-MCNC: 3.4 G/DL (ref 3.4–5)
ALBUMIN/GLOB SERPL: 0.9 (ref 0.8–1.7)
ALP SERPL-CCNC: 42 U/L (ref 45–117)
ALT SERPL-CCNC: 20 U/L (ref 13–56)
ANION GAP SERPL CALC-SCNC: 7 MMOL/L (ref 3–18)
AST SERPL-CCNC: 14 U/L (ref 10–38)
BASOPHILS # BLD: 0 K/UL (ref 0–0.1)
BASOPHILS NFR BLD: 0 % (ref 0–2)
BILIRUB SERPL-MCNC: 0.2 MG/DL (ref 0.2–1)
BUN SERPL-MCNC: 11 MG/DL (ref 7–18)
BUN/CREAT SERPL: 13 (ref 12–20)
CALCIUM SERPL-MCNC: 8.9 MG/DL (ref 8.5–10.1)
CHLORIDE SERPL-SCNC: 104 MMOL/L (ref 100–111)
CHOLEST SERPL-MCNC: 152 MG/DL
CO2 SERPL-SCNC: 27 MMOL/L (ref 21–32)
CREAT SERPL-MCNC: 0.82 MG/DL (ref 0.6–1.3)
DIFFERENTIAL METHOD BLD: NORMAL
EOSINOPHIL # BLD: 0.1 K/UL (ref 0–0.4)
EOSINOPHIL NFR BLD: 2 % (ref 0–5)
ERYTHROCYTE [DISTWIDTH] IN BLOOD BY AUTOMATED COUNT: 14 % (ref 11.6–14.5)
EST. AVERAGE GLUCOSE BLD GHB EST-MCNC: 97 MG/DL
FOLATE SERPL-MCNC: 11.3 NG/ML (ref 3.1–17.5)
GLOBULIN SER CALC-MCNC: 3.7 G/DL (ref 2–4)
GLUCOSE SERPL-MCNC: 92 MG/DL (ref 74–99)
HBA1C MFR BLD: 5 % (ref 4.2–5.6)
HCT VFR BLD AUTO: 38.3 % (ref 35–45)
HDLC SERPL-MCNC: 71 MG/DL (ref 40–60)
HDLC SERPL: 2.1 (ref 0–5)
HGB BLD-MCNC: 12.5 G/DL (ref 12–16)
IMM GRANULOCYTES # BLD AUTO: 0 K/UL (ref 0–0.04)
IMM GRANULOCYTES NFR BLD AUTO: 0 % (ref 0–0.5)
LDLC SERPL CALC-MCNC: 62 MG/DL (ref 0–100)
LIPID PANEL: ABNORMAL
LYMPHOCYTES # BLD: 1.8 K/UL (ref 0.9–3.6)
LYMPHOCYTES NFR BLD: 29 % (ref 21–52)
MCH RBC QN AUTO: 26.1 PG (ref 24–34)
MCHC RBC AUTO-ENTMCNC: 32.6 G/DL (ref 31–37)
MCV RBC AUTO: 80 FL (ref 78–100)
MONOCYTES # BLD: 0.4 K/UL (ref 0.05–1.2)
MONOCYTES NFR BLD: 7 % (ref 3–10)
NEUTS SEG # BLD: 3.7 K/UL (ref 1.8–8)
NEUTS SEG NFR BLD: 61 % (ref 40–73)
NRBC # BLD: 0 K/UL (ref 0–0.01)
NRBC BLD-RTO: 0 PER 100 WBC
PLATELET # BLD AUTO: 220 K/UL (ref 135–420)
PMV BLD AUTO: 11.4 FL (ref 9.2–11.8)
POTASSIUM SERPL-SCNC: 4.2 MMOL/L (ref 3.5–5.5)
PROT SERPL-MCNC: 7.1 G/DL (ref 6.4–8.2)
RBC # BLD AUTO: 4.79 M/UL (ref 4.2–5.3)
SODIUM SERPL-SCNC: 138 MMOL/L (ref 136–145)
T4 FREE SERPL-MCNC: 0.9 NG/DL (ref 0.7–1.5)
TRIGL SERPL-MCNC: 95 MG/DL
TSH SERPL DL<=0.05 MIU/L-ACNC: 0.77 UIU/ML (ref 0.36–3.74)
VIT B12 SERPL-MCNC: 394 PG/ML (ref 211–911)
VLDLC SERPL CALC-MCNC: 19 MG/DL
WBC # BLD AUTO: 6.1 K/UL (ref 4.6–13.2)

## 2023-08-09 PROCEDURE — 84443 ASSAY THYROID STIM HORMONE: CPT

## 2023-08-09 PROCEDURE — 82306 VITAMIN D 25 HYDROXY: CPT

## 2023-08-09 PROCEDURE — 36415 COLL VENOUS BLD VENIPUNCTURE: CPT

## 2023-08-09 PROCEDURE — 82746 ASSAY OF FOLIC ACID SERUM: CPT

## 2023-08-09 PROCEDURE — 80061 LIPID PANEL: CPT

## 2023-08-09 PROCEDURE — 83036 HEMOGLOBIN GLYCOSYLATED A1C: CPT

## 2023-08-09 PROCEDURE — 84480 ASSAY TRIIODOTHYRONINE (T3): CPT

## 2023-08-09 PROCEDURE — 82607 VITAMIN B-12: CPT

## 2023-08-09 PROCEDURE — 80053 COMPREHEN METABOLIC PANEL: CPT

## 2023-08-09 PROCEDURE — 84439 ASSAY OF FREE THYROXINE: CPT

## 2023-08-09 PROCEDURE — 85025 COMPLETE CBC W/AUTO DIFF WBC: CPT

## 2023-08-10 LAB — T3 SERPL-MCNC: 195 NG/DL (ref 71–180)

## 2023-08-25 ENCOUNTER — OFFICE VISIT (OUTPATIENT)
Age: 38
End: 2023-08-25
Payer: OTHER GOVERNMENT

## 2023-08-25 VITALS
SYSTOLIC BLOOD PRESSURE: 125 MMHG | TEMPERATURE: 98.3 F | WEIGHT: 186.4 LBS | DIASTOLIC BLOOD PRESSURE: 63 MMHG | BODY MASS INDEX: 29.96 KG/M2 | HEIGHT: 66 IN | OXYGEN SATURATION: 99 % | RESPIRATION RATE: 16 BRPM | HEART RATE: 67 BPM

## 2023-08-25 DIAGNOSIS — R94.6 ABNORMAL THYROID FUNCTION TEST: ICD-10-CM

## 2023-08-25 DIAGNOSIS — E55.9 HYPOVITAMINOSIS D: ICD-10-CM

## 2023-08-25 DIAGNOSIS — Z00.00 ENCOUNTER FOR WELL ADULT EXAM WITHOUT ABNORMAL FINDINGS: Primary | ICD-10-CM

## 2023-08-25 PROCEDURE — 99395 PREV VISIT EST AGE 18-39: CPT | Performed by: NURSE PRACTITIONER

## 2023-08-25 RX ORDER — NORELGESTROMIN AND ETHINYL ESTRADIOL 150; 35 UG/D; UG/D
1 PATCH TRANSDERMAL WEEKLY
Qty: 3 PATCH | Refills: 11 | Status: SHIPPED | OUTPATIENT
Start: 2023-08-25

## 2023-08-25 RX ORDER — ERGOCALCIFEROL 1.25 MG/1
CAPSULE ORAL
Qty: 20 CAPSULE | Refills: 0 | Status: SHIPPED | OUTPATIENT
Start: 2023-08-28 | End: 2023-12-26

## 2023-08-25 SDOH — ECONOMIC STABILITY: INCOME INSECURITY: HOW HARD IS IT FOR YOU TO PAY FOR THE VERY BASICS LIKE FOOD, HOUSING, MEDICAL CARE, AND HEATING?: NOT HARD AT ALL

## 2023-08-25 SDOH — ECONOMIC STABILITY: FOOD INSECURITY: WITHIN THE PAST 12 MONTHS, YOU WORRIED THAT YOUR FOOD WOULD RUN OUT BEFORE YOU GOT MONEY TO BUY MORE.: NEVER TRUE

## 2023-08-25 SDOH — ECONOMIC STABILITY: HOUSING INSECURITY
IN THE LAST 12 MONTHS, WAS THERE A TIME WHEN YOU DID NOT HAVE A STEADY PLACE TO SLEEP OR SLEPT IN A SHELTER (INCLUDING NOW)?: NO

## 2023-08-25 SDOH — ECONOMIC STABILITY: FOOD INSECURITY: WITHIN THE PAST 12 MONTHS, THE FOOD YOU BOUGHT JUST DIDN'T LAST AND YOU DIDN'T HAVE MONEY TO GET MORE.: NEVER TRUE

## 2023-08-25 ASSESSMENT — PATIENT HEALTH QUESTIONNAIRE - PHQ9
SUM OF ALL RESPONSES TO PHQ QUESTIONS 1-9: 0
SUM OF ALL RESPONSES TO PHQ9 QUESTIONS 1 & 2: 0
SUM OF ALL RESPONSES TO PHQ QUESTIONS 1-9: 0
2. FEELING DOWN, DEPRESSED OR HOPELESS: 0
SUM OF ALL RESPONSES TO PHQ QUESTIONS 1-9: 0
1. LITTLE INTEREST OR PLEASURE IN DOING THINGS: 0
SUM OF ALL RESPONSES TO PHQ QUESTIONS 1-9: 0

## 2023-08-25 ASSESSMENT — ENCOUNTER SYMPTOMS
CHEST TIGHTNESS: 0
SHORTNESS OF BREATH: 0

## 2023-08-25 NOTE — PROGRESS NOTES
Well Adult Note  Name: Jenn Owen Date: 2023   MRN: [id number] Sex: Female   Age: 43 y.o. Ethnicity: Non- / Non    : 1985 Race: Claudette Ivory / Penni Goodpasture is here for well adult exam.  History:  Patient reports she has noticed she been experiencing increased irritation as of late. She also has been experiencing fatigue and also hair thinning. Review of Systems   Constitutional:  Positive for fatigue. Negative for chills and fever. Respiratory:  Negative for chest tightness and shortness of breath. Cardiovascular:  Negative for chest pain and palpitations. Endocrine: Negative for cold intolerance and heat intolerance. Neurological:  Negative for dizziness and headaches. Psychiatric/Behavioral:  Positive for agitation. No Known Allergies      Prior to Visit Medications    Medication Sig Taking? Authorizing Provider   dicyclomine (BENTYL) 20 MG tablet Take 1 tablet by mouth 3 times daily as needed (abdominal cramping) Yes PO Chandler NP   ketoconazole (NIZORAL) 2 % cream Apply topically 2 times daily. Yes PO Chandler NP   triamcinolone (KENALOG) 0.1 % cream Apply topically 2 times daily.  Yes PO Chandler NP   famotidine (PEPCID) 40 MG tablet Take 1 tablet by mouth Yes Ar Automatic Reconciliation   KETOCONAZOLE, TOPICAL, 1 % SHAM Apply topically Yes Ar Automatic Reconciliation   meloxicam (MOBIC) 15 MG tablet Take 1 tablet by mouth daily Yes Ar Automatic Reconciliation   norelgestromin-ethinyl estradiol [facility]) 150-35 MCG/24HR Place 1 patch onto the skin Yes Ar Automatic Reconciliation   dicyclomine (BENTYL) 10 MG capsule Take 10 mg by mouth 3 times daily  Patient not taking: Reported on 2023  Ar Automatic Reconciliation         Past Medical History:   Diagnosis Date    Anemia     IBS (irritable bowel syndrome)     Sickle cell trait (HCC)     Thyroid goiter     Ulnar nerve entrapment syndrome        No past

## 2023-08-31 RX ORDER — DICYCLOMINE HYDROCHLORIDE 10 MG/1
10 CAPSULE ORAL 3 TIMES DAILY
Qty: 90 CAPSULE | Refills: 0 | Status: SHIPPED | OUTPATIENT
Start: 2023-08-31 | End: 2023-09-01 | Stop reason: SINTOL

## 2023-09-01 RX ORDER — DICYCLOMINE HCL 20 MG
20 TABLET ORAL 3 TIMES DAILY PRN
Qty: 90 TABLET | Refills: 3 | Status: SHIPPED | OUTPATIENT
Start: 2023-09-01

## 2023-09-29 ENCOUNTER — TELEPHONE (OUTPATIENT)
Age: 38
End: 2023-09-29

## 2023-09-29 RX ORDER — FLUCONAZOLE 150 MG/1
150 TABLET ORAL WEEKLY
Qty: 2 TABLET | Refills: 0 | Status: SHIPPED | OUTPATIENT
Start: 2023-09-29 | End: 2023-10-07

## 2023-09-29 RX ORDER — NITROFURANTOIN 25; 75 MG/1; MG/1
100 CAPSULE ORAL 2 TIMES DAILY
Qty: 10 CAPSULE | Refills: 0 | Status: SHIPPED | OUTPATIENT
Start: 2023-09-29 | End: 2023-10-04

## 2023-09-29 RX ORDER — CLINDAMYCIN HYDROCHLORIDE 300 MG/1
300 CAPSULE ORAL 2 TIMES DAILY
Qty: 14 CAPSULE | Refills: 0 | Status: SHIPPED | OUTPATIENT
Start: 2023-09-29 | End: 2023-10-06

## 2023-11-29 ENCOUNTER — APPOINTMENT (OUTPATIENT)
Age: 38
End: 2023-11-29
Payer: OTHER GOVERNMENT

## 2023-11-29 ENCOUNTER — HOSPITAL ENCOUNTER (OUTPATIENT)
Facility: HOSPITAL | Age: 38
Setting detail: SPECIMEN
Discharge: HOME | End: 2023-12-02
Payer: OTHER GOVERNMENT

## 2023-11-29 DIAGNOSIS — E55.9 HYPOVITAMINOSIS D: ICD-10-CM

## 2023-11-29 LAB — 25(OH)D3 SERPL-MCNC: 64 NG/ML (ref 30–100)

## 2023-11-29 PROCEDURE — 36415 COLL VENOUS BLD VENIPUNCTURE: CPT

## 2023-11-29 PROCEDURE — 82306 VITAMIN D 25 HYDROXY: CPT

## 2023-12-08 RX ORDER — HYDROCORTISONE ACETATE PRAMOXINE HCL 2.5; 1 G/100G; G/100G
CREAM TOPICAL 2 TIMES DAILY
Qty: 30 G | Refills: 0 | Status: SHIPPED | OUTPATIENT
Start: 2023-12-08 | End: 2023-12-29

## 2024-01-09 RX ORDER — CLOTRIMAZOLE AND BETAMETHASONE DIPROPIONATE 10; .64 MG/G; MG/G
CREAM TOPICAL
Qty: 45 G | Refills: 0 | Status: SHIPPED | OUTPATIENT
Start: 2024-01-09

## 2024-01-18 DIAGNOSIS — U07.1 COVID-19: Primary | ICD-10-CM

## 2024-01-18 RX ORDER — BENZONATATE 200 MG/1
200 CAPSULE ORAL 3 TIMES DAILY PRN
Qty: 30 CAPSULE | Refills: 0 | Status: SHIPPED | OUTPATIENT
Start: 2024-01-18 | End: 2024-01-28

## 2024-01-18 RX ORDER — CODEINE PHOSPHATE/GUAIFENESIN 10-100MG/5
5 LIQUID (ML) ORAL 2 TIMES DAILY PRN
Qty: 50 ML | Refills: 0 | Status: SHIPPED | OUTPATIENT
Start: 2024-01-18 | End: 2024-01-23

## 2024-02-16 RX ORDER — MELOXICAM 15 MG/1
15 TABLET ORAL DAILY
Qty: 90 TABLET | Refills: 1 | Status: SHIPPED | OUTPATIENT
Start: 2024-02-16

## 2024-06-19 ENCOUNTER — NURSE ONLY (OUTPATIENT)
Facility: CLINIC | Age: 39
End: 2024-06-19

## 2024-06-19 DIAGNOSIS — Z11.1 ENCOUNTER FOR PPD SKIN TEST READING: Primary | ICD-10-CM

## 2024-06-19 NOTE — PROGRESS NOTES
Patient had a Nurse visit appointment for TB (PPD) Injection for (work/school/misc). The patient consents to receiving injection and was give the TB (PPD) at 1556, Left Forearm with successful bubble and 0 bleeding. Patient waited 15 minutes and tolerated medication without adverse reactions, no signs or symptoms around the injection site. Patient informed to return to clinic for Nurse Visit in 48-72 business days no later than Friday, 06/21/2024 for TB (PPD) reading and to fill out rest of the form. Patient acknowledged understanding, thank you.    : [employer]   Medication: Tuberculin Purified Protein Derivative (Mantoux)  Lot#: [id number]  NDC#: 19535-062-36  Dose: 0.1 ML  Exp: 12/30/2026  Site: Left Forearm  Time: 1556

## 2024-06-21 ENCOUNTER — NURSE ONLY (OUTPATIENT)
Facility: CLINIC | Age: 39
End: 2024-06-21

## 2024-06-21 NOTE — PROGRESS NOTES
PPD Reading Note  PPD read and results entered in [employer].  Result: 0 mm induration.  Interpretation: Negative  If test not read within 48-72 hours of initial placement, patient advised to repeat in other arm 1-3 weeks after this test.  Allergic reaction: no

## 2024-06-25 RX ORDER — ESCITALOPRAM OXALATE 10 MG/1
10 TABLET ORAL DAILY
Qty: 90 TABLET | Refills: 1 | Status: SHIPPED | OUTPATIENT
Start: 2024-06-25 | End: 2024-12-22

## 2024-06-26 ENCOUNTER — NURSE ONLY (OUTPATIENT)
Facility: CLINIC | Age: 39
End: 2024-06-26

## 2024-06-26 DIAGNOSIS — Z11.1 ENCOUNTER FOR PPD SKIN TEST READING: Primary | ICD-10-CM

## 2024-06-26 NOTE — PROGRESS NOTES
Patient had a Nurse visit appointment for TB (PPD) Injection for (work/school/misc). The patient consents to receiving injection and was give the TB (PPD) at 1608, Right Forearm with successful bubble and )- no bleeding. Patient waited 15 minutes and tolerated medication without adverse reactions, no signs or symptoms around the injection site. Patient informed to return to clinic for Nurse Visit in 48-72 business days no later than Friday, 06/28/2024 for TB (PPD) reading and to fill out rest of the form. Patient acknowledged understanding, thank you.    : [employer]   Medication: Tuberculin Purified Protein Derivative (Mantoux)  Lot#: [id number] (AMB HR WB PRIMARY CARE VACCINE STOCK ([id number]))   NDC#: 76297-032-99   Dose: 0.1 ML  Exp: 12/31/2026   Site: Right Fore Arm   Time: 1608

## 2024-06-27 DIAGNOSIS — R05.1 ACUTE COUGH: Primary | ICD-10-CM

## 2024-06-27 RX ORDER — CODEINE PHOSPHATE/GUAIFENESIN 10-100MG/5
5 LIQUID (ML) ORAL 4 TIMES DAILY PRN
Qty: 100 ML | Refills: 0 | Status: SHIPPED | OUTPATIENT
Start: 2024-06-27 | End: 2024-07-02

## 2024-06-28 ENCOUNTER — NURSE ONLY (OUTPATIENT)
Facility: CLINIC | Age: 39
End: 2024-06-28

## 2024-06-28 NOTE — PROGRESS NOTES
PPD Reading Note  PPD read and results entered in [employer]  Result: 0 mm induration.  Interpretation: Negative  If test not read within 48-72 hours of initial placement, patient advised to repeat in other arm 1-3 weeks after this test.  Allergic reaction: no

## 2024-07-02 RX ORDER — CETIRIZINE HYDROCHLORIDE 10 MG/1
10 TABLET ORAL DAILY
Qty: 90 TABLET | Refills: 3 | Status: SHIPPED | OUTPATIENT
Start: 2024-07-02

## 2024-07-10 ENCOUNTER — NURSE ONLY (OUTPATIENT)
Facility: CLINIC | Age: 39
End: 2024-07-10
Payer: OTHER GOVERNMENT

## 2024-07-10 DIAGNOSIS — Z23 NEED FOR VARICELLA VACCINE: Primary | ICD-10-CM

## 2024-07-10 PROCEDURE — 90716 VAR VACCINE LIVE SUBQ: CPT | Performed by: NURSE PRACTITIONER

## 2024-07-10 PROCEDURE — 90471 IMMUNIZATION ADMIN: CPT | Performed by: NURSE PRACTITIONER

## 2024-07-10 NOTE — PROGRESS NOTES
The patient consents to receiving injection. The patient received the Varivax Injection(s) administered at 1404 in the Right Deltoid successfully. Patient waited 15 minutes and tolerated medication without adverse reactions, no signs or symptoms around the injection site, thank you.    : [employer]   Medication: Varivax- Varicella   Lot#: [id number] (AMB HR  PRIMARY CARE VACCINE STOCK ([id number]))   NDC#: 6902-1700-27   Dose: 0.5 ML  Exp: 01/09/2025   Site: Right deltoid  Time:1404

## 2024-08-12 ENCOUNTER — NURSE ONLY (OUTPATIENT)
Facility: CLINIC | Age: 39
End: 2024-08-12
Payer: OTHER GOVERNMENT

## 2024-08-12 DIAGNOSIS — Z23 NEED FOR VARICELLA VACCINE: Primary | ICD-10-CM

## 2024-08-12 PROCEDURE — 90716 VAR VACCINE LIVE SUBQ: CPT | Performed by: NURSE PRACTITIONER

## 2024-08-12 PROCEDURE — 90471 IMMUNIZATION ADMIN: CPT | Performed by: NURSE PRACTITIONER

## 2024-08-12 NOTE — PROGRESS NOTES
The patient consents to receiving injection. The patient received the varicella injection(s) administered in the left arm Sub Q.  Patient tolerated medication without adverse reactions.

## 2024-08-28 ENCOUNTER — NURSE ONLY (OUTPATIENT)
Facility: CLINIC | Age: 39
End: 2024-08-28
Payer: OTHER GOVERNMENT

## 2024-08-28 DIAGNOSIS — Z23 NEED FOR INFLUENZA VACCINATION: Primary | ICD-10-CM

## 2024-08-28 PROCEDURE — 90471 IMMUNIZATION ADMIN: CPT | Performed by: NURSE PRACTITIONER

## 2024-08-28 PROCEDURE — 90661 CCIIV3 VAC ABX FR 0.5 ML IM: CPT | Performed by: NURSE PRACTITIONER

## 2024-08-28 NOTE — PROGRESS NOTES
The Patient consents to receiving injection. The patient received the Flu Injection(s) administered at 1257 in the Left Deltoid successfully. Patient waited 15 minutes and tolerated medication well without adverse reactions, no signs or symptoms of distress noted around the injection site, thank you.    : [employer]   Medication: Flucelvax   Lot#: [id number]   NDC#: 61068-437-69   Dose: 0.5 ML  Exp: 06/17/2025   Site: Left Deltoid  Time: 1257

## 2024-09-04 ENCOUNTER — HOSPITAL ENCOUNTER (OUTPATIENT)
Facility: HOSPITAL | Age: 39
Setting detail: SPECIMEN
Discharge: HOME | End: 2024-09-07
Payer: OTHER GOVERNMENT

## 2024-09-04 DIAGNOSIS — Z13.6 SCREENING FOR CARDIOVASCULAR, RESPIRATORY, AND GENITOURINARY DISEASES: ICD-10-CM

## 2024-09-04 DIAGNOSIS — Z13.89 SCREENING FOR CARDIOVASCULAR, RESPIRATORY, AND GENITOURINARY DISEASES: ICD-10-CM

## 2024-09-04 DIAGNOSIS — E55.9 HYPOVITAMINOSIS D: Primary | ICD-10-CM

## 2024-09-04 DIAGNOSIS — Z13.83 SCREENING FOR CARDIOVASCULAR, RESPIRATORY, AND GENITOURINARY DISEASES: ICD-10-CM

## 2024-09-04 DIAGNOSIS — E55.9 HYPOVITAMINOSIS D: ICD-10-CM

## 2024-09-04 DIAGNOSIS — Z01.84 IMMUNITY STATUS TESTING: ICD-10-CM

## 2024-09-04 LAB
25(OH)D3 SERPL-MCNC: 26.7 NG/ML (ref 30–100)
ALBUMIN SERPL-MCNC: 3.5 G/DL (ref 3.4–5)
ALBUMIN/GLOB SERPL: 0.9 (ref 0.8–1.7)
ALP SERPL-CCNC: 50 U/L (ref 45–117)
ALT SERPL-CCNC: 17 U/L (ref 13–56)
ANION GAP SERPL CALC-SCNC: 5 MMOL/L (ref 3–18)
AST SERPL-CCNC: 16 U/L (ref 10–38)
BASOPHILS # BLD: 0 K/UL (ref 0–0.1)
BASOPHILS NFR BLD: 0 % (ref 0–2)
BILIRUB SERPL-MCNC: 0.3 MG/DL (ref 0.2–1)
BUN SERPL-MCNC: 10 MG/DL (ref 7–18)
BUN/CREAT SERPL: 11 (ref 12–20)
CALCIUM SERPL-MCNC: 8.9 MG/DL (ref 8.5–10.1)
CHLORIDE SERPL-SCNC: 105 MMOL/L (ref 100–111)
CHOLEST SERPL-MCNC: 156 MG/DL
CO2 SERPL-SCNC: 28 MMOL/L (ref 21–32)
CREAT SERPL-MCNC: 0.93 MG/DL (ref 0.6–1.3)
DIFFERENTIAL METHOD BLD: NORMAL
EOSINOPHIL # BLD: 0.3 K/UL (ref 0–0.4)
EOSINOPHIL NFR BLD: 4 % (ref 0–5)
ERYTHROCYTE [DISTWIDTH] IN BLOOD BY AUTOMATED COUNT: 14.3 % (ref 11.6–14.5)
EST. AVERAGE GLUCOSE BLD GHB EST-MCNC: 105 MG/DL
GLOBULIN SER CALC-MCNC: 3.7 G/DL (ref 2–4)
GLUCOSE SERPL-MCNC: 93 MG/DL (ref 74–99)
HBA1C MFR BLD: 5.3 % (ref 4.2–5.6)
HCT VFR BLD AUTO: 38.1 % (ref 35–45)
HDLC SERPL-MCNC: 67 MG/DL (ref 40–60)
HDLC SERPL: 2.3 (ref 0–5)
HGB BLD-MCNC: 12.1 G/DL (ref 12–16)
IMM GRANULOCYTES # BLD AUTO: 0 K/UL (ref 0–0.04)
IMM GRANULOCYTES NFR BLD AUTO: 0 % (ref 0–0.5)
LDLC SERPL CALC-MCNC: 64 MG/DL (ref 0–100)
LIPID PANEL: ABNORMAL
LYMPHOCYTES # BLD: 2 K/UL (ref 0.9–3.6)
LYMPHOCYTES NFR BLD: 29 % (ref 21–52)
MCH RBC QN AUTO: 26.1 PG (ref 24–34)
MCHC RBC AUTO-ENTMCNC: 31.8 G/DL (ref 31–37)
MCV RBC AUTO: 82.3 FL (ref 78–100)
MONOCYTES # BLD: 0.5 K/UL (ref 0.05–1.2)
MONOCYTES NFR BLD: 8 % (ref 3–10)
NEUTS SEG # BLD: 4 K/UL (ref 1.8–8)
NEUTS SEG NFR BLD: 59 % (ref 40–73)
NRBC # BLD: 0 K/UL (ref 0–0.01)
NRBC BLD-RTO: 0 PER 100 WBC
PLATELET # BLD AUTO: 266 K/UL (ref 135–420)
PMV BLD AUTO: 11.1 FL (ref 9.2–11.8)
POTASSIUM SERPL-SCNC: 4.1 MMOL/L (ref 3.5–5.5)
PROT SERPL-MCNC: 7.2 G/DL (ref 6.4–8.2)
RBC # BLD AUTO: 4.63 M/UL (ref 4.2–5.3)
SODIUM SERPL-SCNC: 138 MMOL/L (ref 136–145)
TRIGL SERPL-MCNC: 125 MG/DL
VLDLC SERPL CALC-MCNC: 25 MG/DL
WBC # BLD AUTO: 6.9 K/UL (ref 4.6–13.2)

## 2024-09-04 PROCEDURE — 86787 VARICELLA-ZOSTER ANTIBODY: CPT

## 2024-09-04 PROCEDURE — 85025 COMPLETE CBC W/AUTO DIFF WBC: CPT

## 2024-09-04 PROCEDURE — 82306 VITAMIN D 25 HYDROXY: CPT

## 2024-09-04 PROCEDURE — 80061 LIPID PANEL: CPT

## 2024-09-04 PROCEDURE — 36415 COLL VENOUS BLD VENIPUNCTURE: CPT

## 2024-09-04 PROCEDURE — 80053 COMPREHEN METABOLIC PANEL: CPT

## 2024-09-04 PROCEDURE — 83036 HEMOGLOBIN GLYCOSYLATED A1C: CPT

## 2024-09-05 LAB — VZV IGG SER IA-ACNC: 2794 INDEX

## 2024-09-10 ENCOUNTER — OFFICE VISIT (OUTPATIENT)
Facility: CLINIC | Age: 39
End: 2024-09-10
Payer: OTHER GOVERNMENT

## 2024-09-10 VITALS
SYSTOLIC BLOOD PRESSURE: 110 MMHG | BODY MASS INDEX: 31.18 KG/M2 | HEIGHT: 66 IN | OXYGEN SATURATION: 99 % | HEART RATE: 66 BPM | WEIGHT: 194 LBS | DIASTOLIC BLOOD PRESSURE: 56 MMHG | RESPIRATION RATE: 18 BRPM | TEMPERATURE: 99.3 F

## 2024-09-10 DIAGNOSIS — E55.9 HYPOVITAMINOSIS D: ICD-10-CM

## 2024-09-10 DIAGNOSIS — Z00.00 ENCOUNTER FOR WELL ADULT EXAM WITHOUT ABNORMAL FINDINGS: ICD-10-CM

## 2024-09-10 DIAGNOSIS — Z12.31 SCREENING MAMMOGRAM FOR BREAST CANCER: Primary | ICD-10-CM

## 2024-09-10 DIAGNOSIS — R06.83 SNORING: ICD-10-CM

## 2024-09-10 PROCEDURE — 99396 PREV VISIT EST AGE 40-64: CPT | Performed by: NURSE PRACTITIONER

## 2024-09-10 RX ORDER — ERGOCALCIFEROL 1.25 MG/1
50000 CAPSULE, LIQUID FILLED ORAL WEEKLY
Qty: 12 CAPSULE | Refills: 1 | Status: SHIPPED | OUTPATIENT
Start: 2024-09-10

## 2024-09-12 RX ORDER — NITROFURANTOIN 25; 75 MG/1; MG/1
100 CAPSULE ORAL 2 TIMES DAILY
Qty: 10 CAPSULE | Refills: 0 | Status: SHIPPED | OUTPATIENT
Start: 2024-09-12 | End: 2024-09-17

## 2024-09-12 ASSESSMENT — ENCOUNTER SYMPTOMS
SHORTNESS OF BREATH: 0
CHEST TIGHTNESS: 0

## 2024-09-30 RX ORDER — DICYCLOMINE HCL 20 MG
20 TABLET ORAL 3 TIMES DAILY PRN
Qty: 90 TABLET | Refills: 3 | Status: SHIPPED | OUTPATIENT
Start: 2024-09-30

## 2024-10-29 ENCOUNTER — HOSPITAL ENCOUNTER (OUTPATIENT)
Facility: HOSPITAL | Age: 39
Discharge: HOME | End: 2024-11-01
Payer: OTHER GOVERNMENT

## 2024-10-29 VITALS — HEIGHT: 66 IN | BODY MASS INDEX: 31.33 KG/M2

## 2024-10-29 DIAGNOSIS — Z12.31 SCREENING MAMMOGRAM FOR BREAST CANCER: ICD-10-CM

## 2024-10-29 PROCEDURE — 77063 BREAST TOMOSYNTHESIS BI: CPT

## 2024-11-26 RX ORDER — IPRATROPIUM BROMIDE 42 UG/1
2 SPRAY, METERED NASAL 4 TIMES DAILY
Qty: 1 EACH | Refills: 3 | Status: SHIPPED | OUTPATIENT
Start: 2024-11-26

## 2024-12-11 ENCOUNTER — OFFICE VISIT (OUTPATIENT)
Age: 39
End: 2024-12-11
Payer: OTHER GOVERNMENT

## 2024-12-11 ENCOUNTER — TELEPHONE (OUTPATIENT)
Dept: SLEEP MEDICINE | Facility: HOSPITAL | Age: 39
End: 2024-12-11

## 2024-12-11 VITALS
DIASTOLIC BLOOD PRESSURE: 78 MMHG | RESPIRATION RATE: 18 BRPM | TEMPERATURE: 98.6 F | WEIGHT: 201.6 LBS | BODY MASS INDEX: 32.4 KG/M2 | SYSTOLIC BLOOD PRESSURE: 120 MMHG | HEART RATE: 86 BPM | OXYGEN SATURATION: 96 % | HEIGHT: 66 IN

## 2024-12-11 DIAGNOSIS — R06.83 SNORING: Primary | ICD-10-CM

## 2024-12-11 DIAGNOSIS — G47.33 OSA (OBSTRUCTIVE SLEEP APNEA): ICD-10-CM

## 2024-12-11 PROCEDURE — 99204 OFFICE O/P NEW MOD 45 MIN: CPT | Performed by: INTERNAL MEDICINE

## 2024-12-11 ASSESSMENT — SLEEP AND FATIGUE QUESTIONNAIRES
HOW LIKELY ARE YOU TO NOD OFF OR FALL ASLEEP WHEN YOU ARE A PASSENGER IN A CAR FOR AN HOUR WITHOUT A BREAK: WOULD NEVER DOZE
HOW LIKELY ARE YOU TO NOD OFF OR FALL ASLEEP WHILE WATCHING TV: WOULD NEVER DOZE
HOW LIKELY ARE YOU TO NOD OFF OR FALL ASLEEP WHILE WATCHING TV: WOULD NEVER DOZE
HOW LIKELY ARE YOU TO NOD OFF OR FALL ASLEEP IN A CAR, WHILE STOPPED FOR A FEW MINUTES IN TRAFFIC: WOULD NEVER DOZE
HOW LIKELY ARE YOU TO NOD OFF OR FALL ASLEEP WHILE LYING DOWN TO REST IN THE AFTERNOON WHEN CIRCUMSTANCES PERMIT: WOULD NEVER DOZE
HOW LIKELY ARE YOU TO NOD OFF OR FALL ASLEEP WHILE SITTING INACTIVE IN A PUBLIC PLACE: WOULD NEVER DOZE
HOW LIKELY ARE YOU TO NOD OFF OR FALL ASLEEP WHILE SITTING AND TALKING TO SOMEONE: WOULD NEVER DOZE
HOW LIKELY ARE YOU TO NOD OFF OR FALL ASLEEP WHILE SITTING AND READING: WOULD NEVER DOZE
HOW LIKELY ARE YOU TO NOD OFF OR FALL ASLEEP WHILE SITTING INACTIVE IN A PUBLIC PLACE: WOULD NEVER DOZE
HOW LIKELY ARE YOU TO NOD OFF OR FALL ASLEEP IN A CAR, WHILE STOPPED FOR A FEW MINUTES IN TRAFFIC: WOULD NEVER DOZE
HOW LIKELY ARE YOU TO NOD OFF OR FALL ASLEEP WHILE SITTING QUIETLY AFTER LUNCH WITHOUT ALCOHOL: WOULD NEVER DOZE
HOW LIKELY ARE YOU TO NOD OFF OR FALL ASLEEP WHILE SITTING AND TALKING TO SOMEONE: WOULD NEVER DOZE
HOW LIKELY ARE YOU TO NOD OFF OR FALL ASLEEP WHILE SITTING AND TALKING TO SOMEONE: WOULD NEVER DOZE
HOW LIKELY ARE YOU TO NOD OFF OR FALL ASLEEP WHEN YOU ARE A PASSENGER IN A CAR FOR AN HOUR WITHOUT A BREAK: WOULD NEVER DOZE
HOW LIKELY ARE YOU TO NOD OFF OR FALL ASLEEP WHILE SITTING QUIETLY AFTER LUNCH WITHOUT ALCOHOL: WOULD NEVER DOZE
HOW LIKELY ARE YOU TO NOD OFF OR FALL ASLEEP WHILE SITTING INACTIVE IN A PUBLIC PLACE: WOULD NEVER DOZE
ESS TOTAL SCORE: 0
HOW LIKELY ARE YOU TO NOD OFF OR FALL ASLEEP WHILE SITTING QUIETLY AFTER LUNCH WITHOUT ALCOHOL: WOULD NEVER DOZE
HOW LIKELY ARE YOU TO NOD OFF OR FALL ASLEEP WHILE LYING DOWN TO REST IN THE AFTERNOON WHEN CIRCUMSTANCES PERMIT: WOULD NEVER DOZE
HOW LIKELY ARE YOU TO NOD OFF OR FALL ASLEEP WHILE SITTING AND READING: WOULD NEVER DOZE
HOW LIKELY ARE YOU TO NOD OFF OR FALL ASLEEP WHILE LYING DOWN TO REST IN THE AFTERNOON WHEN CIRCUMSTANCES PERMIT: WOULD NEVER DOZE
ESS TOTAL SCORE: 0
HOW LIKELY ARE YOU TO NOD OFF OR FALL ASLEEP WHEN YOU ARE A PASSENGER IN A CAR FOR AN HOUR WITHOUT A BREAK: WOULD NEVER DOZE
HOW LIKELY ARE YOU TO NOD OFF OR FALL ASLEEP IN A CAR, WHILE STOPPED FOR A FEW MINUTES IN TRAFFIC: WOULD NEVER DOZE
HOW LIKELY ARE YOU TO NOD OFF OR FALL ASLEEP WHILE WATCHING TV: WOULD NEVER DOZE
HOW LIKELY ARE YOU TO NOD OFF OR FALL ASLEEP WHILE SITTING AND READING: WOULD NEVER DOZE

## 2024-12-11 ASSESSMENT — PATIENT HEALTH QUESTIONNAIRE - PHQ9
SUM OF ALL RESPONSES TO PHQ QUESTIONS 1-9: 0
2. FEELING DOWN, DEPRESSED OR HOPELESS: NOT AT ALL
SUM OF ALL RESPONSES TO PHQ QUESTIONS 1-9: 0
SUM OF ALL RESPONSES TO PHQ QUESTIONS 1-9: 0
1. LITTLE INTEREST OR PLEASURE IN DOING THINGS: NOT AT ALL
SUM OF ALL RESPONSES TO PHQ9 QUESTIONS 1 & 2: 0
SUM OF ALL RESPONSES TO PHQ QUESTIONS 1-9: 0

## 2024-12-11 NOTE — PROGRESS NOTES
BMI 32.54 kg/m²      Physical Exam: Neck Size 13 Malampati 3  Chin: Retrognathia  General: comfortable, no acute distress  HEENT: pupils reactive, sclera anicteric,   CVS: S1S2 no murmurs  RS: Mod AE bilaterally, no tactile fremitus or egophony, no accessory muscle use  Neuro: non focal, awake, alert  Extrm: no leg edema, clubbing or cyanosis    Data review:   No Prior Sleep Study    CBC w/Diff No results for input(s): \"WBC\", \"RBC\", \"HGB\", \"HCT\", \"PLT\" in the last 72 hours.    Invalid input(s): \"GRANS\", \"LYMPH\", \"EOS\"        Chemistry No results for input(s): \"GLU\", \"NA\", \"K\", \"CL\", \"CO2\", \"BUN\", \"MG\", \"PHOS\", \"TP\", \"GLOB\" in the last 72 hours.    Invalid input(s): \"CREA\", \"CA\", \"AGAP\", \"BUCR\", \"TBIL\", \"GPT\", \"AP\", \"ALB\", \"AGRAT\"     Lactic Acid No components found for: \"LACT\"        Micro  Results       ** No results found for the last 336 hours. **             ABG No results found for: \"LPMO2\", \"FIO2\", \"PEEP\", \"PH1\", \"PCO2\", \"PO2\", \"HCO3\", \"TCO2\", \"MODE\", \"DRAWSITE\"     Liver Enzymes Lab Results   Component Value Date    ALKPHOS 50 09/04/2024    ALT 17 09/04/2024    AST 16 09/04/2024    BILITOT 0.3 09/04/2024         Cardiac Enzymes No results found for: \"CKTOTAL\", \"CKMB\", \"CKMBINDEX\", \"TROPHS\"     BNP No results found for: \"BNP\"      Thyroid  Lab Results   Component Value Date    TSH 0.77 08/09/2023          XR (Most Recent). CXR reviewed by me and compared with previous CXR No results found for this or any previous visit from the past 3650 days.        CT (Most Recent) No results found for this or any previous visit from the past 3650 days.        EKG No results found for this or any previous visit.     ECHO No results found for this or any previous visit.         PFT No results found for this or any previous visit.    No results found for this or any previous visit.                      Faina Adhikari M.D  Pulmonary Critical Care & Sleep Medicine   [phone]

## 2024-12-11 NOTE — PATIENT INSTRUCTIONS
and other mental or emotional conditions.  Changes in your sleep habits or surroundings. This includes changes that happen where you sleep, such as noise, light, or sleeping in a different bed. It also includes changes in your sleep pattern, such as having jet lag or working a late shift.  Health problems, such as pain, breathing problems, and restless legs syndrome.  Lack of regular exercise.  Using alcohol, nicotine, or caffeine before bed.  How can you help yourself?  Here are some tips that may help you sleep more soundly and wake up feeling more refreshed.  Your sleeping area   Use your bedroom only for sleeping and sex. A bit of light reading may help you fall asleep. But if it doesn't, do your reading elsewhere in the house. Try not to use your TV, computer, smartphone, or tablet while you are in bed.  Be sure your bed is big enough to stretch out comfortably, especially if you have a sleep partner.  Keep your bedroom quiet, dark, and cool. Use curtains, blinds, or a sleep mask to block out light. To block out noise, use earplugs, soothing music, or a \"white noise\" machine.  Your evening and bedtime routine   Create a relaxing bedtime routine. You might want to take a warm shower or bath, or listen to soothing music.  Go to bed at the same time every night. And get up at the same time every morning, even if you feel tired.  What to avoid   Limit caffeine (coffee, tea, caffeinated sodas) during the day, and don't have any for at least 6 hours before bedtime.  Avoid drinking alcohol before bedtime. Alcohol can cause you to wake up more often during the night.  Try not to smoke or use tobacco, especially in the evening. Nicotine can keep you awake.  Limit naps during the day, especially close to bedtime.  Avoid lying in bed awake for too long. If you can't fall asleep or if you wake up in the middle of the night and can't get back to sleep within about 20 minutes, get out of bed and go to another room until you

## 2024-12-18 RX ORDER — ESCITALOPRAM OXALATE 10 MG/1
10 TABLET ORAL DAILY
Qty: 90 TABLET | Refills: 1 | Status: SHIPPED | OUTPATIENT
Start: 2024-12-18 | End: 2025-06-16

## 2025-01-15 ENCOUNTER — HOSPITAL ENCOUNTER (OUTPATIENT)
Facility: HOSPITAL | Age: 40
Setting detail: SPECIMEN
Discharge: HOME | End: 2025-01-18
Payer: COMMERCIAL

## 2025-01-15 DIAGNOSIS — R53.83 OTHER FATIGUE: ICD-10-CM

## 2025-01-15 DIAGNOSIS — R53.83 OTHER FATIGUE: Primary | ICD-10-CM

## 2025-01-15 LAB
FOLATE SERPL-MCNC: 18.4 NG/ML (ref 3.1–17.5)
VIT B12 SERPL-MCNC: 349 PG/ML (ref 211–911)

## 2025-01-15 PROCEDURE — 82746 ASSAY OF FOLIC ACID SERUM: CPT

## 2025-01-15 PROCEDURE — 82607 VITAMIN B-12: CPT

## 2025-01-15 PROCEDURE — 36415 COLL VENOUS BLD VENIPUNCTURE: CPT

## 2025-02-04 ENCOUNTER — HOSPITAL ENCOUNTER (OUTPATIENT)
Dept: SLEEP MEDICINE | Facility: HOSPITAL | Age: 40
Discharge: HOME | End: 2025-02-07
Attending: INTERNAL MEDICINE
Payer: COMMERCIAL

## 2025-02-04 DIAGNOSIS — R06.83 SNORING: ICD-10-CM

## 2025-02-04 DIAGNOSIS — G47.33 OSA (OBSTRUCTIVE SLEEP APNEA): ICD-10-CM

## 2025-02-04 PROCEDURE — 95800 SLP STDY UNATTENDED: CPT

## 2025-02-06 NOTE — PROCEDURES
Faina Adhikari M.D  Pulmonary Critical Care & Sleep Medicine    Sleep Study Report  Date: 2/6/2025   Name: Sandra Carpio  YOB: 1985    Procedures    INDICATION: Fatigue, snoring and snore arousals, ESS 0.  CONCLUSION:        Moderate  obstructive sleep apnea syndrome with AHI of 22, and respiratory disturbance index (RDI) of 27. Central apnea index 0.    AHI by AASM criteria 27/hr with drop in volume by 30% and 3% oxygen desaturation & by CMS criteria  15/hr with drop in volume by 30% and oxygen desaturation of 4%.    REM AHI: 32.    Calculated sleep time: 7 hr 28min.  Lowest oxygen saturation 86.  Oxygen desaturation index 21.    Supine AHI: 29.    Sleep Latency: 16 min. REM latency 70 min.    Significant snoring events reported.    No significant arrhythmia seen.    Home sleep apnea test was completed with WatchPAT, a type III device including the following measurement/data: Peripheral arterial tone, actigraphy, body position, snore, pulse oximetry, sleep staging and heart rate.  This report was generated based on review of flow data from the study  Study was adequate for interpretation    RECOMMENDATIONS:     Patient has follow up appointment on 2/25/25 and will discuss results of sleep study and decide further course of action.     Consider sleep apnea treatment as appropriate for this patient.    Treatment options may include positive airway pressure device such as continuous-CPAP, or auto adjusting-APAP and bilevel.  Oral appliance implementation during sleep or Inspire/ Upper airway surgery may be considered in selected patient as deemed suitable by appropriate specialist.    If APAP is utilized for titration and or treatment, initial setting of Auto CPAP: 5-15 is recommended.     If CPAP is utilized without a PAP titration, a sleep center based PAP titration is recommended.    When applicable utilize CPAP device efficacy reports, additional testing and face-to-face clinical evaluation

## 2025-02-24 ASSESSMENT — SLEEP AND FATIGUE QUESTIONNAIRES
HOW LIKELY ARE YOU TO NOD OFF OR FALL ASLEEP WHILE WATCHING TV: WOULD NEVER DOZE
HOW LIKELY ARE YOU TO NOD OFF OR FALL ASLEEP WHILE WATCHING TV: WOULD NEVER DOZE
HOW LIKELY ARE YOU TO NOD OFF OR FALL ASLEEP WHILE LYING DOWN TO REST IN THE AFTERNOON WHEN CIRCUMSTANCES PERMIT: SLIGHT CHANCE OF DOZING
HOW LIKELY ARE YOU TO NOD OFF OR FALL ASLEEP WHILE SITTING AND TALKING TO SOMEONE: WOULD NEVER DOZE
HOW LIKELY ARE YOU TO NOD OFF OR FALL ASLEEP WHILE SITTING QUIETLY AFTER LUNCH WITHOUT ALCOHOL: WOULD NEVER DOZE
HOW LIKELY ARE YOU TO NOD OFF OR FALL ASLEEP WHILE LYING DOWN TO REST IN THE AFTERNOON WHEN CIRCUMSTANCES PERMIT: SLIGHT CHANCE OF DOZING
HOW LIKELY ARE YOU TO NOD OFF OR FALL ASLEEP WHILE SITTING INACTIVE IN A PUBLIC PLACE: WOULD NEVER DOZE
HOW LIKELY ARE YOU TO NOD OFF OR FALL ASLEEP WHEN YOU ARE A PASSENGER IN A CAR FOR AN HOUR WITHOUT A BREAK: WOULD NEVER DOZE
HOW LIKELY ARE YOU TO NOD OFF OR FALL ASLEEP WHILE SITTING AND TALKING TO SOMEONE: WOULD NEVER DOZE
HOW LIKELY ARE YOU TO NOD OFF OR FALL ASLEEP IN A CAR, WHILE STOPPED FOR A FEW MINUTES IN TRAFFIC: WOULD NEVER DOZE
HOW LIKELY ARE YOU TO NOD OFF OR FALL ASLEEP WHEN YOU ARE A PASSENGER IN A CAR FOR AN HOUR WITHOUT A BREAK: WOULD NEVER DOZE
HOW LIKELY ARE YOU TO NOD OFF OR FALL ASLEEP WHILE SITTING INACTIVE IN A PUBLIC PLACE: WOULD NEVER DOZE
HOW LIKELY ARE YOU TO NOD OFF OR FALL ASLEEP WHILE SITTING AND READING: WOULD NEVER DOZE
HOW LIKELY ARE YOU TO NOD OFF OR FALL ASLEEP WHILE SITTING AND READING: WOULD NEVER DOZE
ESS TOTAL SCORE: 1
HOW LIKELY ARE YOU TO NOD OFF OR FALL ASLEEP IN A CAR, WHILE STOPPED FOR A FEW MINUTES IN TRAFFIC: WOULD NEVER DOZE
HOW LIKELY ARE YOU TO NOD OFF OR FALL ASLEEP WHILE SITTING QUIETLY AFTER LUNCH WITHOUT ALCOHOL: WOULD NEVER DOZE

## 2025-02-25 ENCOUNTER — OFFICE VISIT (OUTPATIENT)
Age: 40
End: 2025-02-25
Payer: COMMERCIAL

## 2025-02-25 VITALS
DIASTOLIC BLOOD PRESSURE: 69 MMHG | OXYGEN SATURATION: 97 % | WEIGHT: 206.6 LBS | HEART RATE: 78 BPM | SYSTOLIC BLOOD PRESSURE: 119 MMHG | RESPIRATION RATE: 18 BRPM | BODY MASS INDEX: 33.35 KG/M2 | TEMPERATURE: 98.2 F

## 2025-02-25 DIAGNOSIS — G47.33 OSA (OBSTRUCTIVE SLEEP APNEA): Primary | ICD-10-CM

## 2025-02-25 DIAGNOSIS — Z99.89 CPAP (CONTINUOUS POSITIVE AIRWAY PRESSURE) DEPENDENCE: Chronic | ICD-10-CM

## 2025-02-25 PROCEDURE — 99214 OFFICE O/P EST MOD 30 MIN: CPT | Performed by: INTERNAL MEDICINE

## 2025-02-25 RX ORDER — CYCLOBENZAPRINE HCL 10 MG
TABLET ORAL
COMMUNITY
Start: 2024-06-03

## 2025-02-25 NOTE — PROGRESS NOTES
Coltenyuri Carpio presents today for   Chief Complaint   Patient presents with    Follow-up     Snoring    Results     Sleep study done 25       Is someone accompanying this pt? No    Is the patient using any DME equipment during OV? No    -DME Company NA    Depression Screenin/11/2024     8:14 AM   PHQ-9 Questionaire   Little interest or pleasure in doing things 0   Feeling down, depressed, or hopeless 0   PHQ-9 Total Score 0       Learning Needs Questionnaire:     No question data found.      Fall Risk:          No data to display                 Abuse Screenin/10/2024     2:00 PM   AMB Abuse Screening   Do you ever feel afraid of your partner? N   Are you in a relationship with someone who physically or mentally threatens you? N   Is it safe for you to go home? Y         Coordination of Care:    1. Have you been to the ER, urgent care clinic since your last visit? Hospitalized since your last visit? No    2. Have you seen or consulted any other health care providers outside of the [facility] since your last visit? Include any pap smears or colon screening. No    Medication list has been update per patient.

## 2025-02-25 NOTE — PROGRESS NOTES
Faina Adhikari M.D  Pulmonary Critical Care & Sleep Medicine     Sleep Medicine Note    Name: Sandra Carpio     : 1985     Date: 2025      Referring provider: No ref. provider found   PCP: Jaye Morataya APRN - NP   IMPRESSION:   39-year-old female with history of snoring, bruxism, elevated Mallampati, and mild obesity came in for evaluation of sleep apnea  Moderate sleep apnea with AHI of 22, RDI of 22, lowest oxygen saturation of 86%  Patient came for sleep study follow-up, discussed all the treatment options with the patient, including CPAP-auto CPAP, dental device and inspire.  After discussing all the pros and cons of the management, and due to severity of patient condition with daytime fatigue and comorbid condition, patient decided to proceed with CPAP  Prescription/referral for auto CPAP 5-15 sent  Will follow-up 3 months with compliance download  If patient continued to have issues with the CPAP she would like to treat with dental appliance, she does not wish to pursue with anything like inspire  Snoring:   Optimal treatment of MONSERRAT will help  Avoidance of alcohol and other substances discussed with the patient  Bruxism:   Currently wearing mouthguard at nighttime  Optimal treatment of MONSERRAT should help  Continue dental follow-up  Mild obesity:   BMI of 32.5 with current weight of 201 pound  Weight loss is encouraged  Patient has mild sleep apnea losing weight might help with overall snoring and sleep apnea   Plan:  Auto CPAP 5-15  Weight loss is encouraged  Sleep hygiene measures were discussed with patient at length.   and workplace safety reviewed.  Drowsy and inattentive driving should be avoided.  Follow Up 2025   Orders:   Orders Placed This Encounter   Procedures    DME - DURABLE MEDICAL EQUIPMENT     Legth of need (GABRIEL): Lifetime (99)  Dx: Moderate MONSERRAT Ahi 22  Start patient on Auto CPAP 5-15 cwp with a Flex/EPR: 2.  PLEASE ISSUE A RESMED UNIT. Fit mask for patient comfort.

## 2025-03-11 ENCOUNTER — RESULTS FOLLOW-UP (OUTPATIENT)
Facility: HOSPITAL | Age: 40
End: 2025-03-11

## 2025-03-11 ENCOUNTER — HOSPITAL ENCOUNTER (OUTPATIENT)
Facility: HOSPITAL | Age: 40
Setting detail: SPECIMEN
Discharge: HOME | End: 2025-03-14
Payer: COMMERCIAL

## 2025-03-11 ENCOUNTER — TELEMEDICINE (OUTPATIENT)
Facility: CLINIC | Age: 40
End: 2025-03-11
Payer: COMMERCIAL

## 2025-03-11 DIAGNOSIS — E66.811 OBESITY (BMI 30.0-34.9): ICD-10-CM

## 2025-03-11 DIAGNOSIS — F41.9 ANXIETY: ICD-10-CM

## 2025-03-11 DIAGNOSIS — R63.5 ABNORMAL WEIGHT GAIN: ICD-10-CM

## 2025-03-11 DIAGNOSIS — E55.9 HYPOVITAMINOSIS D: ICD-10-CM

## 2025-03-11 DIAGNOSIS — E55.9 HYPOVITAMINOSIS D: Primary | ICD-10-CM

## 2025-03-11 LAB
25(OH)D3 SERPL-MCNC: 66 NG/ML (ref 30–100)
T4 FREE SERPL-MCNC: 0.9 NG/DL (ref 0.7–1.5)
TSH SERPL DL<=0.05 MIU/L-ACNC: 0.76 UIU/ML (ref 0.36–3.74)

## 2025-03-11 PROCEDURE — 84439 ASSAY OF FREE THYROXINE: CPT

## 2025-03-11 PROCEDURE — 99214 OFFICE O/P EST MOD 30 MIN: CPT | Performed by: NURSE PRACTITIONER

## 2025-03-11 PROCEDURE — 84480 ASSAY TRIIODOTHYRONINE (T3): CPT

## 2025-03-11 PROCEDURE — 84443 ASSAY THYROID STIM HORMONE: CPT

## 2025-03-11 PROCEDURE — 82306 VITAMIN D 25 HYDROXY: CPT

## 2025-03-11 PROCEDURE — 36415 COLL VENOUS BLD VENIPUNCTURE: CPT

## 2025-03-11 RX ORDER — PHENTERMINE HYDROCHLORIDE 15 MG/1
15 CAPSULE ORAL EVERY MORNING
Qty: 30 CAPSULE | Refills: 0 | Status: SHIPPED | OUTPATIENT
Start: 2025-03-11 | End: 2025-04-10

## 2025-03-11 NOTE — PROGRESS NOTES
volume on her iPad and dimming the lights. She has been taking Lexapro at night and it does not make her sleepy during the day.    Supplemental Information  She is scheduled to receive her CPAP machine on Thursday.    Prior to Admission medications    Medication Sig Start Date End Date Taking? Authorizing Provider   cyclobenzaprine (FLEXERIL) 10 MG tablet  6/3/24   Provider, MD Yoon   escitalopram (LEXAPRO) 10 MG tablet Take 1 tablet by mouth daily 12/18/24 6/16/25  Jaye Morataya APRN - NP   ipratropium (ATROVENT) 0.06 % nasal spray 2 sprays by Each Nostril route 4 times daily 11/26/24   Jaye Morataya APRN - NP   dicyclomine (BENTYL) 20 MG tablet Take 1 tablet by mouth 3 times daily as needed (abdominal cramping) 9/30/24   Jaye Morataya APRN - NP   vitamin D (ERGOCALCIFEROL) 1.25 MG (37349 UT) CAPS capsule Take 1 capsule by mouth once a week 9/10/24   Jaye Morataya APRN - NP   cetirizine (ZYRTEC) 10 MG tablet Take 1 tablet by mouth daily 7/2/24   Jaye Morataya APRN - NP   meloxicam (MOBIC) 15 MG tablet Take 1 tablet by mouth daily 2/16/24   Jaye Morataya APRN - NP   clotrimazole-betamethasone (LOTRISONE) 1-0.05 % cream Apply topically 2-3 times daily x2 weeks 1/9/24   Jaye Morataya APRN - NP   XULANE 150-35 MCG/24HR Place 1 patch onto the skin once a week 8/25/23   Jaye Morataya APRN - NP   ketoconazole (NIZORAL) 2 % cream Apply topically 2 times daily. 2/22/23   Jaye Morataya APRN - NP   triamcinolone (KENALOG) 0.1 % cream Apply topically 2 times daily. 2/22/23   Jaye Morataya APRN - NP   famotidine (PEPCID) 40 MG tablet Take 1 tablet by mouth 11/29/22   Automatic Reconciliation, Ar   KETOCONAZOLE, TOPICAL, 1 % SHAM Apply topically    Automatic Reconciliation, Ar     No Known Allergies    Review of Systems    Objective:   There were no vitals taken for this visit.   General: alert, cooperative, no distress   Mental  status: normal mood, behavior, speech, dress, motor activity, and thought

## 2025-03-13 LAB — T3 SERPL-MCNC: 194 NG/DL (ref 71–180)

## 2025-03-19 DIAGNOSIS — R63.5 ABNORMAL WEIGHT GAIN: ICD-10-CM

## 2025-03-19 DIAGNOSIS — E66.811 OBESITY (BMI 30.0-34.9): Primary | ICD-10-CM

## 2025-03-19 RX ORDER — PHENTERMINE HYDROCHLORIDE 37.5 MG/1
37.5 TABLET ORAL
Qty: 30 TABLET | Refills: 0 | Status: SHIPPED | OUTPATIENT
Start: 2025-03-19 | End: 2025-04-18

## 2025-03-25 ENCOUNTER — RESULTS FOLLOW-UP (OUTPATIENT)
Facility: CLINIC | Age: 40
End: 2025-03-25

## 2025-03-25 DIAGNOSIS — L29.89 PRURITUS OF NIPPLE: Primary | ICD-10-CM

## 2025-04-09 DIAGNOSIS — B00.1 COLD SORE: Primary | ICD-10-CM

## 2025-04-09 RX ORDER — VALACYCLOVIR HYDROCHLORIDE 1 G/1
2000 TABLET, FILM COATED ORAL 2 TIMES DAILY
Qty: 20 TABLET | Refills: 5 | Status: SHIPPED | OUTPATIENT
Start: 2025-04-09 | End: 2025-04-10

## 2025-05-02 RX ORDER — ESCITALOPRAM OXALATE 20 MG/1
20 TABLET ORAL DAILY
Qty: 90 TABLET | Refills: 3 | Status: SHIPPED | OUTPATIENT
Start: 2025-05-02

## 2025-05-09 ENCOUNTER — HOSPITAL ENCOUNTER (OUTPATIENT)
Facility: HOSPITAL | Age: 40
Setting detail: SPECIMEN
Discharge: HOME | End: 2025-05-12
Payer: COMMERCIAL

## 2025-05-09 ENCOUNTER — LAB (OUTPATIENT)
Facility: CLINIC | Age: 40
End: 2025-05-09

## 2025-05-09 DIAGNOSIS — Z11.1 SCREENING FOR TUBERCULOSIS: Primary | ICD-10-CM

## 2025-05-09 PROCEDURE — 86480 TB TEST CELL IMMUN MEASURE: CPT

## 2025-05-09 PROCEDURE — 36415 COLL VENOUS BLD VENIPUNCTURE: CPT

## 2025-05-13 LAB
M TB IFN-G BLD-IMP: NEGATIVE
M TB IFN-G CD4+ T-CELLS BLD-ACNC: 0.09 IU/ML
M TBIFN-G CD4+ CD8+T-CELLS BLD-ACNC: 0.09 IU/ML
QUANTIFERON CRITERIA: NORMAL
QUANTIFERON MITOGEN VALUE: >10 IU/ML
QUANTIFERON NIL VALUE: 0.2 IU/ML

## 2025-05-20 RX ORDER — MOMETASONE FUROATE 1 MG/G
CREAM TOPICAL DAILY
COMMUNITY
Start: 2025-04-07

## 2025-05-27 ENCOUNTER — OFFICE VISIT (OUTPATIENT)
Age: 40
End: 2025-05-27
Payer: COMMERCIAL

## 2025-05-27 VITALS
HEIGHT: 66 IN | TEMPERATURE: 98 F | HEART RATE: 77 BPM | OXYGEN SATURATION: 100 % | SYSTOLIC BLOOD PRESSURE: 118 MMHG | BODY MASS INDEX: 34.23 KG/M2 | RESPIRATION RATE: 22 BRPM | WEIGHT: 213 LBS | DIASTOLIC BLOOD PRESSURE: 78 MMHG

## 2025-05-27 DIAGNOSIS — Z99.89 CPAP (CONTINUOUS POSITIVE AIRWAY PRESSURE) DEPENDENCE: Chronic | ICD-10-CM

## 2025-05-27 DIAGNOSIS — R06.83 SNORING: ICD-10-CM

## 2025-05-27 DIAGNOSIS — G47.33 OSA (OBSTRUCTIVE SLEEP APNEA): Primary | ICD-10-CM

## 2025-05-27 PROCEDURE — 99214 OFFICE O/P EST MOD 30 MIN: CPT | Performed by: INTERNAL MEDICINE

## 2025-05-27 ASSESSMENT — SLEEP AND FATIGUE QUESTIONNAIRES
HOW LIKELY ARE YOU TO NOD OFF OR FALL ASLEEP WHILE LYING DOWN TO REST IN THE AFTERNOON WHEN CIRCUMSTANCES PERMIT: SLIGHT CHANCE OF DOZING
ESS TOTAL SCORE: 2
HOW LIKELY ARE YOU TO NOD OFF OR FALL ASLEEP WHILE WATCHING TV: WOULD NEVER DOZE
HOW LIKELY ARE YOU TO NOD OFF OR FALL ASLEEP WHEN YOU ARE A PASSENGER IN A CAR FOR AN HOUR WITHOUT A BREAK: SLIGHT CHANCE OF DOZING
HOW LIKELY ARE YOU TO NOD OFF OR FALL ASLEEP WHILE SITTING AND READING: WOULD NEVER DOZE
HOW LIKELY ARE YOU TO NOD OFF OR FALL ASLEEP WHILE WATCHING TV: WOULD NEVER DOZE
HOW LIKELY ARE YOU TO NOD OFF OR FALL ASLEEP WHILE SITTING INACTIVE IN A PUBLIC PLACE: WOULD NEVER DOZE
HOW LIKELY ARE YOU TO NOD OFF OR FALL ASLEEP WHEN YOU ARE A PASSENGER IN A CAR FOR AN HOUR WITHOUT A BREAK: SLIGHT CHANCE OF DOZING
HOW LIKELY ARE YOU TO NOD OFF OR FALL ASLEEP IN A CAR, WHILE STOPPED FOR A FEW MINUTES IN TRAFFIC: WOULD NEVER DOZE
HOW LIKELY ARE YOU TO NOD OFF OR FALL ASLEEP WHILE SITTING AND READING: WOULD NEVER DOZE
HOW LIKELY ARE YOU TO NOD OFF OR FALL ASLEEP IN A CAR, WHILE STOPPED FOR A FEW MINUTES IN TRAFFIC: WOULD NEVER DOZE
HOW LIKELY ARE YOU TO NOD OFF OR FALL ASLEEP WHILE SITTING INACTIVE IN A PUBLIC PLACE: WOULD NEVER DOZE
HOW LIKELY ARE YOU TO NOD OFF OR FALL ASLEEP WHILE SITTING AND TALKING TO SOMEONE: WOULD NEVER DOZE
HOW LIKELY ARE YOU TO NOD OFF OR FALL ASLEEP WHILE SITTING AND TALKING TO SOMEONE: WOULD NEVER DOZE
HOW LIKELY ARE YOU TO NOD OFF OR FALL ASLEEP WHILE SITTING QUIETLY AFTER LUNCH WITHOUT ALCOHOL: WOULD NEVER DOZE
HOW LIKELY ARE YOU TO NOD OFF OR FALL ASLEEP WHILE SITTING QUIETLY AFTER LUNCH WITHOUT ALCOHOL: WOULD NEVER DOZE
HOW LIKELY ARE YOU TO NOD OFF OR FALL ASLEEP WHILE LYING DOWN TO REST IN THE AFTERNOON WHEN CIRCUMSTANCES PERMIT: SLIGHT CHANCE OF DOZING

## 2025-05-27 ASSESSMENT — PATIENT HEALTH QUESTIONNAIRE - PHQ9
2. FEELING DOWN, DEPRESSED OR HOPELESS: NOT AT ALL
SUM OF ALL RESPONSES TO PHQ QUESTIONS 1-9: 0
1. LITTLE INTEREST OR PLEASURE IN DOING THINGS: NOT AT ALL
SUM OF ALL RESPONSES TO PHQ QUESTIONS 1-9: 0

## 2025-05-27 NOTE — PROGRESS NOTES
Sandra Carpio presents today for   Chief Complaint   Patient presents with    Follow-up     CPAP.       Is someone accompanying this pt? no    Is the patient using any DME equipment during OV?: N/A    -DME Company: [employer]     Depression Screenin/27/2025     8:31 AM   PHQ-9    Little interest or pleasure in doing things 0   Feeling down, depressed, or hopeless 0   PHQ-2 Score 0   PHQ-9 Total Score 0        Stover Sleepiness Scale:      2025     7:54 AM   Sleep Medicine   Sitting and reading 0   Watching TV 0   Sitting, inactive in a public place (e.g. a theatre or a meeting) 0   As a passenger in a car for an hour without a break 1   Lying down to rest in the afternoon when circumstances permit 1   Sitting and talking to someone 0   Sitting quietly after a lunch without alcohol 0   In a car, while stopped for a few minutes in traffic 0   Stover Sleepiness Score 2        Patient-reported               Coordination of Care:  1. Have you been to the ER, urgent care clinic since your last visit? Hospitalized since your last visit?  no    2. Have you seen or consulted any other health care providers outside of the [facility] since your last visit? Include any pap smears or colon screening. no    Medication list has been updated according to patient.

## 2025-05-27 NOTE — PROGRESS NOTES
Faina Adhikari M.D  Pulmonary Critical Care & Sleep Medicine     Sleep Medicine Note    Name: Sandra Carpio     : 1985     Date: 2025      Referring provider: No ref. provider found   PCP: Jaye Morataya APRN - NP   IMPRESSION:   39-year-old female with history of snoring, bruxism, elevated Mallampati, and mild obesity came in for evaluation of sleep apnea  Moderate sleep apnea with AHI of 22, RDI of 22, lowest oxygen saturation of 86%  Marked improvement with current CPAP therapy  Improvement in sleepiness fatigue and other symptoms noted  If patient continued to have issues with the CPAP she would like to treat with dental appliance, she does not wish to pursue with anything like inspire  CPAP dependency: DME company is [employer]  Currently on AutoSet CPAP 5-15  Median pressure of 7.8 Max pressure of 11 AHI is remaining around 1 with excellent compliance  Some issues with the dry nose, advised to use nasal saline drops  Continue current CPAP therapy follow-up in 1 year with compliance download  Snoring:   Marked improvement in symptoms noted  Avoidance of alcohol and other substances discussed with the patient  Bruxism:   Currently wearing mouthguard at nighttime  As per the patient some improvement in symptoms noted  Continue dental follow-up  Mild obesity:   BMI of 32.5 with weight of 201 pound-2025  Current weight of 213 pounds on May 2025  Weight loss is encouraged  Patient has mild sleep apnea losing weight might help with overall snoring and sleep apnea   Plan:  Continue AutoSet CPAP 5-15  Supplies through [employer]  Getting the benefit out of current therapy  Weight loss is encouraged  Sleep hygiene measures were discussed with patient at length.   and workplace safety reviewed.  Drowsy and inattentive driving should be avoided.  Follow Up 1 year   Orders:   No orders of the defined types were placed in this encounter.        Old records reviewed discussed results and management plan with

## 2025-05-30 DIAGNOSIS — R94.6 ABNORMAL THYROID FUNCTION TEST: Primary | ICD-10-CM

## 2025-06-23 RX ORDER — CYCLOBENZAPRINE HCL 5 MG
5-10 TABLET ORAL 3 TIMES DAILY PRN
Qty: 60 TABLET | Refills: 1 | Status: SHIPPED | OUTPATIENT
Start: 2025-06-23

## 2025-08-29 DIAGNOSIS — Z13.6 SCREENING FOR CARDIOVASCULAR, RESPIRATORY, AND GENITOURINARY DISEASES: ICD-10-CM

## 2025-08-29 DIAGNOSIS — Z13.83 SCREENING FOR CARDIOVASCULAR, RESPIRATORY, AND GENITOURINARY DISEASES: ICD-10-CM

## 2025-08-29 DIAGNOSIS — Z13.89 SCREENING FOR CARDIOVASCULAR, RESPIRATORY, AND GENITOURINARY DISEASES: ICD-10-CM

## 2025-08-29 DIAGNOSIS — E55.9 HYPOVITAMINOSIS D: Primary | ICD-10-CM

## 2025-09-02 ENCOUNTER — HOSPITAL ENCOUNTER (OUTPATIENT)
Facility: HOSPITAL | Age: 40
Setting detail: SPECIMEN
Discharge: HOME | End: 2025-09-05
Payer: COMMERCIAL

## 2025-09-02 DIAGNOSIS — E55.9 HYPOVITAMINOSIS D: ICD-10-CM

## 2025-09-02 DIAGNOSIS — Z13.83 SCREENING FOR CARDIOVASCULAR, RESPIRATORY, AND GENITOURINARY DISEASES: ICD-10-CM

## 2025-09-02 DIAGNOSIS — Z13.6 SCREENING FOR CARDIOVASCULAR, RESPIRATORY, AND GENITOURINARY DISEASES: ICD-10-CM

## 2025-09-02 DIAGNOSIS — Z13.89 SCREENING FOR CARDIOVASCULAR, RESPIRATORY, AND GENITOURINARY DISEASES: ICD-10-CM

## 2025-09-02 LAB
25(OH)D3 SERPL-MCNC: 23.2 NG/ML (ref 30–100)
ALBUMIN SERPL-MCNC: 3.6 G/DL (ref 3.4–5)
ALBUMIN/GLOB SERPL: 1 (ref 0.8–1.7)
ALP SERPL-CCNC: 56 U/L (ref 45–117)
ALT SERPL-CCNC: 23 U/L (ref 10–35)
ANION GAP SERPL CALC-SCNC: 13 MMOL/L (ref 3–18)
AST SERPL-CCNC: 30 U/L (ref 10–38)
BASOPHILS # BLD: 0.03 K/UL (ref 0–0.1)
BASOPHILS NFR BLD: 0.5 % (ref 0–2)
BILIRUB SERPL-MCNC: 0.2 MG/DL (ref 0.2–1)
BUN SERPL-MCNC: 9 MG/DL (ref 6–23)
BUN/CREAT SERPL: 11 (ref 12–20)
CALCIUM SERPL-MCNC: 9.6 MG/DL (ref 8.5–10.1)
CHLORIDE SERPL-SCNC: 103 MMOL/L (ref 98–107)
CHOLEST SERPL-MCNC: 153 MG/DL
CO2 SERPL-SCNC: 24 MMOL/L (ref 21–32)
CREAT SERPL-MCNC: 0.86 MG/DL (ref 0.6–1.3)
DIFFERENTIAL METHOD BLD: NORMAL
EOSINOPHIL # BLD: 0.07 K/UL (ref 0–0.4)
EOSINOPHIL NFR BLD: 1.1 % (ref 0–5)
ERYTHROCYTE [DISTWIDTH] IN BLOOD BY AUTOMATED COUNT: 14.4 % (ref 11.6–14.5)
EST. AVERAGE GLUCOSE BLD GHB EST-MCNC: 119 MG/DL
GLOBULIN SER CALC-MCNC: 3.6 G/DL (ref 2–4)
GLUCOSE SERPL-MCNC: 90 MG/DL (ref 74–108)
HBA1C MFR BLD: 5.8 % (ref 4.2–5.6)
HCT VFR BLD AUTO: 40 % (ref 35–45)
HDLC SERPL-MCNC: 63 MG/DL (ref 40–60)
HDLC SERPL: 2.4 (ref 0–5)
HGB BLD-MCNC: 12.4 G/DL (ref 12–16)
IMM GRANULOCYTES # BLD AUTO: 0.01 K/UL (ref 0–0.04)
IMM GRANULOCYTES NFR BLD AUTO: 0.2 % (ref 0–0.5)
LDLC SERPL CALC-MCNC: 63 MG/DL (ref 0–100)
LYMPHOCYTES # BLD: 1.8 K/UL (ref 0.9–3.6)
LYMPHOCYTES NFR BLD: 27.2 % (ref 21–52)
MCH RBC QN AUTO: 25.2 PG (ref 24–34)
MCHC RBC AUTO-ENTMCNC: 31 G/DL (ref 31–37)
MCV RBC AUTO: 81.1 FL (ref 78–100)
MONOCYTES # BLD: 0.5 K/UL (ref 0.05–1.2)
MONOCYTES NFR BLD: 7.6 % (ref 3–10)
NEUTS SEG # BLD: 4.21 K/UL (ref 1.8–8)
NEUTS SEG NFR BLD: 63.4 % (ref 40–73)
NRBC # BLD: 0 K/UL (ref 0–0.01)
NRBC BLD-RTO: 0 PER 100 WBC
PLATELET # BLD AUTO: 250 K/UL (ref 135–420)
PMV BLD AUTO: 11.7 FL (ref 9.2–11.8)
POTASSIUM SERPL-SCNC: 4.4 MMOL/L (ref 3.5–5.5)
PROT SERPL-MCNC: 7.1 G/DL (ref 6.4–8.2)
RBC # BLD AUTO: 4.93 M/UL (ref 4.2–5.3)
SODIUM SERPL-SCNC: 140 MMOL/L (ref 136–145)
TRIGL SERPL-MCNC: 135 MG/DL (ref 0–150)
VLDLC SERPL CALC-MCNC: 27 MG/DL
WBC # BLD AUTO: 6.6 K/UL (ref 4.6–13.2)

## 2025-09-02 PROCEDURE — 80053 COMPREHEN METABOLIC PANEL: CPT

## 2025-09-02 PROCEDURE — 80061 LIPID PANEL: CPT

## 2025-09-02 PROCEDURE — 36415 COLL VENOUS BLD VENIPUNCTURE: CPT

## 2025-09-02 PROCEDURE — 83036 HEMOGLOBIN GLYCOSYLATED A1C: CPT

## 2025-09-02 PROCEDURE — 82306 VITAMIN D 25 HYDROXY: CPT

## 2025-09-02 PROCEDURE — 85025 COMPLETE CBC W/AUTO DIFF WBC: CPT
